# Patient Record
Sex: FEMALE | Race: WHITE
[De-identification: names, ages, dates, MRNs, and addresses within clinical notes are randomized per-mention and may not be internally consistent; named-entity substitution may affect disease eponyms.]

---

## 2020-05-13 ENCOUNTER — HOSPITAL ENCOUNTER (INPATIENT)
Dept: HOSPITAL 62 - ER | Age: 45
LOS: 4 days | Discharge: HOME | DRG: 439 | End: 2020-05-17
Attending: INTERNAL MEDICINE | Admitting: FAMILY MEDICINE
Payer: COMMERCIAL

## 2020-05-13 DIAGNOSIS — E66.01: ICD-10-CM

## 2020-05-13 DIAGNOSIS — K85.20: Primary | ICD-10-CM

## 2020-05-13 DIAGNOSIS — E16.2: ICD-10-CM

## 2020-05-13 DIAGNOSIS — K70.10: ICD-10-CM

## 2020-05-13 DIAGNOSIS — Z98.84: ICD-10-CM

## 2020-05-13 DIAGNOSIS — R94.5: ICD-10-CM
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
## 2020-05-13 DIAGNOSIS — F10.20: ICD-10-CM

## 2020-05-13 DIAGNOSIS — E87.1: ICD-10-CM

## 2020-05-13 LAB
ADD MANUAL DIFF: NO
ALBUMIN SERPL-MCNC: 4.9 G/DL (ref 3.5–5)
ALP SERPL-CCNC: 233 U/L (ref 38–126)
ANION GAP SERPL CALC-SCNC: 21 MMOL/L (ref 5–19)
APPEARANCE UR: (no result)
APTT PPP: YELLOW S
AST SERPL-CCNC: 1255 U/L (ref 14–36)
BASOPHILS # BLD AUTO: 0.1 10^3/UL (ref 0–0.2)
BASOPHILS NFR BLD AUTO: 2.5 % (ref 0–2)
BILIRUB DIRECT SERPL-MCNC: 3 MG/DL (ref 0–0.4)
BILIRUB SERPL-MCNC: 4.7 MG/DL (ref 0.2–1.3)
BILIRUB UR QL STRIP: (no result)
BUN SERPL-MCNC: 4 MG/DL (ref 7–20)
CALCIUM: 9.1 MG/DL (ref 8.4–10.2)
CHLORIDE SERPL-SCNC: 94 MMOL/L (ref 98–107)
CO2 SERPL-SCNC: 19 MMOL/L (ref 22–30)
EOSINOPHIL # BLD AUTO: 0 10^3/UL (ref 0–0.6)
EOSINOPHIL NFR BLD AUTO: 0.5 % (ref 0–6)
ERYTHROCYTE [DISTWIDTH] IN BLOOD BY AUTOMATED COUNT: 18.4 % (ref 11.5–14)
GLUCOSE SERPL-MCNC: 74 MG/DL (ref 75–110)
GLUCOSE UR STRIP-MCNC: NEGATIVE MG/DL
HCT VFR BLD CALC: 41.4 % (ref 36–47)
HGB BLD-MCNC: 14.1 G/DL (ref 12–15.5)
KETONES UR STRIP-MCNC: 80 MG/DL
LYMPHOCYTES # BLD AUTO: 0.9 10^3/UL (ref 0.5–4.7)
LYMPHOCYTES NFR BLD AUTO: 27.6 % (ref 13–45)
MCH RBC QN AUTO: 33.4 PG (ref 27–33.4)
MCHC RBC AUTO-ENTMCNC: 34.1 G/DL (ref 32–36)
MCV RBC AUTO: 98 FL (ref 80–97)
MONOCYTES # BLD AUTO: 0.3 10^3/UL (ref 0.1–1.4)
MONOCYTES NFR BLD AUTO: 10.1 % (ref 3–13)
NEUTROPHILS # BLD AUTO: 1.8 10^3/UL (ref 1.7–8.2)
NEUTS SEG NFR BLD AUTO: 59.3 % (ref 42–78)
PH UR STRIP: 6 [PH] (ref 5–9)
PLATELET # BLD: 153 10^3/UL (ref 150–450)
POTASSIUM SERPL-SCNC: 4.7 MMOL/L (ref 3.6–5)
PROT SERPL-MCNC: 8 G/DL (ref 6.3–8.2)
PROT UR STRIP-MCNC: 30 MG/DL
RBC # BLD AUTO: 4.23 10^6/UL (ref 3.72–5.28)
SP GR UR STRIP: 1.01
TOTAL CELLS COUNTED % (AUTO): 100 %
UROBILINOGEN UR-MCNC: 4 MG/DL (ref ?–2)
WBC # BLD AUTO: 3.1 10^3/UL (ref 4–10.5)

## 2020-05-13 PROCEDURE — 83690 ASSAY OF LIPASE: CPT

## 2020-05-13 PROCEDURE — 85730 THROMBOPLASTIN TIME PARTIAL: CPT

## 2020-05-13 PROCEDURE — 80076 HEPATIC FUNCTION PANEL: CPT

## 2020-05-13 PROCEDURE — 76705 ECHO EXAM OF ABDOMEN: CPT

## 2020-05-13 PROCEDURE — 85610 PROTHROMBIN TIME: CPT

## 2020-05-13 PROCEDURE — S0119 ONDANSETRON 4 MG: HCPCS

## 2020-05-13 PROCEDURE — 93976 VASCULAR STUDY: CPT

## 2020-05-13 PROCEDURE — 36415 COLL VENOUS BLD VENIPUNCTURE: CPT

## 2020-05-13 PROCEDURE — 83735 ASSAY OF MAGNESIUM: CPT

## 2020-05-13 PROCEDURE — 84481 FREE ASSAY (FT-3): CPT

## 2020-05-13 PROCEDURE — 96375 TX/PRO/DX INJ NEW DRUG ADDON: CPT

## 2020-05-13 PROCEDURE — 86900 BLOOD TYPING SEROLOGIC ABO: CPT

## 2020-05-13 PROCEDURE — 84443 ASSAY THYROID STIM HORMONE: CPT

## 2020-05-13 PROCEDURE — 80053 COMPREHEN METABOLIC PANEL: CPT

## 2020-05-13 PROCEDURE — 82150 ASSAY OF AMYLASE: CPT

## 2020-05-13 PROCEDURE — 74177 CT ABD & PELVIS W/CONTRAST: CPT

## 2020-05-13 PROCEDURE — 84703 CHORIONIC GONADOTROPIN ASSAY: CPT

## 2020-05-13 PROCEDURE — 82962 GLUCOSE BLOOD TEST: CPT

## 2020-05-13 PROCEDURE — 96361 HYDRATE IV INFUSION ADD-ON: CPT

## 2020-05-13 PROCEDURE — 99285 EMERGENCY DEPT VISIT HI MDM: CPT

## 2020-05-13 PROCEDURE — 71046 X-RAY EXAM CHEST 2 VIEWS: CPT

## 2020-05-13 PROCEDURE — 81001 URINALYSIS AUTO W/SCOPE: CPT

## 2020-05-13 PROCEDURE — 86901 BLOOD TYPING SEROLOGIC RH(D): CPT

## 2020-05-13 PROCEDURE — 83036 HEMOGLOBIN GLYCOSYLATED A1C: CPT

## 2020-05-13 PROCEDURE — 80061 LIPID PANEL: CPT

## 2020-05-13 PROCEDURE — 80048 BASIC METABOLIC PNL TOTAL CA: CPT

## 2020-05-13 PROCEDURE — 86850 RBC ANTIBODY SCREEN: CPT

## 2020-05-13 PROCEDURE — 85025 COMPLETE CBC W/AUTO DIFF WBC: CPT

## 2020-05-13 PROCEDURE — S0028 INJECTION, FAMOTIDINE, 20 MG: HCPCS

## 2020-05-13 PROCEDURE — 84484 ASSAY OF TROPONIN QUANT: CPT

## 2020-05-13 PROCEDURE — 96374 THER/PROPH/DIAG INJ IV PUSH: CPT

## 2020-05-13 PROCEDURE — 82270 OCCULT BLOOD FECES: CPT

## 2020-05-13 PROCEDURE — 85027 COMPLETE CBC AUTOMATED: CPT

## 2020-05-13 RX ADMIN — SODIUM CHLORIDE, SODIUM LACTATE, POTASSIUM CHLORIDE, AND CALCIUM CHLORIDE PRN MLS/HR: .6; .31; .03; .02 INJECTION, SOLUTION INTRAVENOUS at 20:48

## 2020-05-13 RX ADMIN — SODIUM CHLORIDE, SODIUM LACTATE, POTASSIUM CHLORIDE, AND CALCIUM CHLORIDE PRN MLS/HR: .6; .31; .03; .02 INJECTION, SOLUTION INTRAVENOUS at 23:36

## 2020-05-13 RX ADMIN — HEPARIN SODIUM SCH UNIT: 5000 INJECTION, SOLUTION INTRAVENOUS; SUBCUTANEOUS at 23:06

## 2020-05-13 RX ADMIN — MORPHINE SULFATE PRN MG: 10 INJECTION INTRAMUSCULAR; INTRAVENOUS; SUBCUTANEOUS at 23:06

## 2020-05-13 RX ADMIN — Medication SCH ML: at 23:15

## 2020-05-13 RX ADMIN — FAMOTIDINE SCH MG: 10 INJECTION INTRAVENOUS at 23:05

## 2020-05-13 NOTE — RADIOLOGY REPORT (SQ)
EXAM DESCRIPTION:  CHEST 2 VIEWS



IMAGES COMPLETED DATE/TIME:  5/13/2020 5:26 pm



REASON FOR STUDY:  short of breath



COMPARISON:  None.



EXAM PARAMETERS:  NUMBER OF VIEWS: two views

TECHNIQUE: Digital Frontal and Lateral radiographic views of the chest acquired.

RADIATION DOSE: NA

LIMITATIONS: none



FINDINGS:  LUNGS AND PLEURA: Nonspecific elevation right diaphragm.  No opacities, masses or pneumoth
orax. No pleural effusion.

MEDIASTINUM AND HILAR STRUCTURES: No masses or contour abnormalities.

HEART AND VASCULAR STRUCTURES: Heart normal size.  No evidence for failure.

BONES: No acute findings.

HARDWARE: None in the chest.

OTHER: No other significant finding.



IMPRESSION:  NO ACUTE RADIOGRAPHIC FINDING IN THE CHEST.



TECHNICAL DOCUMENTATION:  JOB ID:  [id number]

 2011 [employer]- All Rights Reserved



Reading location - IP/workstation name: [email or web address]

## 2020-05-13 NOTE — RADIOLOGY REPORT (SQ)
EXAM DESCRIPTION:  U/S ABDOMEN LTD W/DOPPLER



IMAGES COMPLETED DATE/TIME:  5/13/2020 7:12 pm



REASON FOR STUDY:  elevated lipase and lft



COMPARISON:  None.



TECHNIQUE:  Dynamic and static grayscale images acquired of the abdomen and recorded on PACS. Additio
nal selected color Doppler and spectral images recorded.



LIMITATIONS:  None.



FINDINGS:  PANCREAS: Slightly prominent.  No obvious inflammatory changes.

LIVER: Hepatomegaly.  Increased echogenicity.  Heterogeneous echotexture.

LIVER VASCULATURE: Normal directional flow of the main portal vein and hepatic veins.

GALLBLADDER: Surgically absent.

ULTRASOUND-DETECTED NEFF'S SIGN: Not applicable.

INTRAHEPATIC DUCTS AND COMMON DUCT: CBD and intrahepatic ducts normal caliber. No filling defects.

INFERIOR VENA CAVA: Normal flow.

AORTA: No aneurysm.

RIGHT KIDNEY:  Normal size, 11.1 cm. Normal echogenicity. No solid or suspicious masses. No hydroneph
rosis. No calcifications.

PERITONEAL AND RIGHT PLEURAL SPACE: No ascites or effusions.

OTHER: No other significant findings.



IMPRESSION:  Hepatomegaly.  Hepatic steatosis.  Pancreas is slightly prominent but there are no obvio
us inflammatory changes.



TECHNICAL DOCUMENTATION:  JOB ID:  [id number]

 [employer]- All Rights Reserved



Reading location - IP/workstation name: [email or web address]

## 2020-05-13 NOTE — ER DOCUMENT REPORT
ED General





- General


Chief Complaint: Abdominal Pain


Stated Complaint: SHORTNESS OF BREATH


Time Seen by Provider: 05/13/20 16:48


Mode of Arrival: Wheelchair


Information source: Patient


Notes: 





44-year-old female presents to ED for complaint of shortness of breath with hard

black stools history of anemia.  She states she has a horrible taste in her 

mouth and frequent dry heaves with a sore throat.


TRAVEL OUTSIDE OF THE [place] IN LAST 30 DAYS: No





- HPI


Onset: Last week


Quality of pain: Cramping - Couple weeks


Associated symptoms: Nausea, Other - The patient black tarry stools short of 

breath dizziness decreased appetite and nausea


Exacerbated by: Denies


Relieved by: Denies


Similar symptoms previously: Yes


Recently seen / treated by doctor: No





- Related Data


Allergies/Adverse Reactions: 


                                        





No Known Allergies Allergy (Unverified 05/07/16 14:54)


   











Past Medical History





- General


Information source: Patient





- Social History


Smoking Status: Never Smoker


Frequency of alcohol use: Heavy - Couple cocktails a night


Drug Abuse: None


Lives with: Family


Family History: Reviewed & Not Pertinent


Patient has homicidal ideation: No





- Past Medical History


Cardiac Medical History: Reports: None


Pulmonary Medical History: Reports: None


EENT Medical History: Reports: None


Neurological Medical History: Reports: None


Endocrine Medical History: Reports: None


Renal/ Medical History: Reports: None


Malignancy Medical History: Reports: None


GI Medical History: Reports: Other - Gallbladder was attached to the liver 

causing mild to be leakage with gallb


Musculoskeletal Medical History: Reports Hx Musculoskeletal Trauma


Skin Medical History: Reports None


Psychiatric Medical History: Reports: None


Traumatic Medical History: Reports: None


Infectious Medical History: Reports: None


Past Surgical History: Reports: Hx Abdominal Surgery - Gastric sleeve, Hx 

Cholecystectomy - With complications





Review of Systems





- Review of Systems


Constitutional: No symptoms reported


EENT: No symptoms reported


Cardiovascular: No symptoms reported


Respiratory: No symptoms reported


Gastrointestinal: Abdominal pain, Nausea, Constipation.  denies: Vomiting


Genitourinary: No symptoms reported


Female Genitourinary: No symptoms reported


Musculoskeletal: No symptoms reported


Skin: No symptoms reported


Hematologic/Lymphatic: No symptoms reported


Neurological/Psychological: No symptoms reported


-: Yes All other systems reviewed and negative





Physical Exam





- Vital signs


Vitals: 


                                        











Temp Pulse Resp BP Pulse Ox


 


 98.5 F   109 H  21 H  154/111 H  98 


 


 05/13/20 16:44  05/13/20 16:44  05/13/20 16:44  05/13/20 16:44  05/13/20 16:44











Interpretation: Normal





- General


General appearance: Appears well, Alert





- HEENT


Head: Normocephalic, Atraumatic


Eyes: Normal


Pupils: PERRL





- Respiratory


Respiratory status: No respiratory distress


Chest status: Nontender


Breath sounds: Normal


Chest palpation: Normal





- Cardiovascular


Rhythm: Regular


Heart sounds: Normal auscultation


Murmur: No





- Abdominal


Inspection: Normal


Distension: No distension


Bowel sounds: Normal


Tenderness: Tender


Organomegaly: No organomegaly





- Back


Back: Normal, Nontender





- Extremities


General upper extremity: Normal inspection, Nontender, Normal color, Normal ROM,

Normal temperature


General lower extremity: Normal inspection, Nontender, Normal color, Normal ROM,

Normal temperature, Normal weight bearing.  No: Cosme's sign





- Neurological


Neuro grossly intact: Yes


Cognition: Normal


Orientation: AAOx4


[place] Coma Scale Eye Opening: Spontaneous


[place] Coma Scale Verbal: Oriented


[place] Coma Scale Motor: Obeys Commands


[place] Coma Scale Total: 15


Speech: Normal


Motor strength normal: LUE, RUE, LLE, RLE


Sensory: Normal





- Psychological


Associated symptoms: Normal affect, Normal mood





- Skin


Skin Temperature: Warm


Skin Moisture: Dry


Skin Color: Normal





Course





- Re-evaluation


Re-evalutation: 





05/13/20 20:04


Consulted Dr. Roper after getting the lab results.  He recommended the 

ultrasound right upper quadrant.  This did show hepatomegaly with slight 

prominence to the pancreatic pancreas but no abnormal ducts and no ductal 

disease.  I did consult Dr. Weiland who will be admitting the patient for 

pancreatitis and elevated LFTs.  Dr. Weiland is aware that she drinks several 

cocktails a night.





- Vital Signs


Vital signs: 


                                        











Temp Pulse Resp BP Pulse Ox


 


 98.2 F   76   18   155/76 H  99 


 


 05/13/20 21:29  05/13/20 21:29  05/13/20 21:29  05/13/20 21:29  05/13/20 21:29














- Laboratory


Result Diagrams: 


                                 05/13/20 17:28





                                 05/13/20 17:28


Laboratory results interpreted by me: 


                                        











  05/13/20 05/13/20 05/13/20





  17:28 17:28 17:28


 


WBC  3.1 L  


 


MCV  98 H  


 


RDW  18.4 H  


 


Baso % (Auto)  2.5 H  


 


Sodium   133.6 L 


 


Chloride   94 L 


 


Carbon Dioxide   19 L 


 


Anion Gap   21 H 


 


BUN   4 L 


 


Glucose   74 L 


 


Total Bilirubin   4.7 H 


 


Direct Bilirubin   3.0 H 


 


AST   1255 H 


 


ALT   487 H 


 


Alkaline Phosphatase   233 H 


 


Lipase   2241.7 H 


 


Urine Protein    30 H


 


Urine Ketones    80 H


 


Urine Blood    SMALL H


 


Urine Bilirubin    SMALL H


 


Urine Urobilinogen    4.0 H














- Diagnostic Test


Radiology reviewed: Image reviewed, Reports reviewed





Discharge





- Discharge


Clinical Impression: 


 Elevated LFTs





Pancreatitis


Qualifiers:


 Chronicity: acute Pancreatitis type: unspecified pancreatitis type Acute panc

reatitis complication: unspecified Qualified Code(s): K85.90 - Acute 

pancreatitis without necrosis or infection, unspecified





Disposition: ADMITTED AS INPATIENT


Admitting Provider: Weiland (Hospitalist)


Unit Admitted: Medical Floor

## 2020-05-13 NOTE — RADIOLOGY REPORT (SQ)
CT ABDOMEN PELVIS WITH IV CONTRAST



EXAM DATE: 5/13/2020 12:00 AM CDT



HISTORY: Abdominal pain. Elevated lipase and liver function

enzymes.



COMPARISON: None.



TECHNIQUE: 



CT scan of the abdomen and pelvis was performed with IV contrast.





This exam was performed according to our departmental

dose-optimization program, which includes automated exposure

control, adjustment of the mA and/or kV according to patient size

and/or use of iterative reconstruction technique.



FINDINGS:



The lung bases are clear. No pleural or pericardial effusions.

There is no hiatal hernia with prior gastric surgery noted.



There is diffuse hepatic steatosis. There has been a prior

cholecystectomy. Query mild inflammatory stranding of the

pancreatic head. The spleen, adrenal glands, kidneys, and pelvic

organs are normal. No obstructing urinary stones.



The colon is underdistended with submucosal fatty infiltration.

The appendix is unremarkable. There is no small bowel obstruction

or inflammation. No intraperitoneal free fluid or free air is

seen.



There are mild degenerative changes of the spine. The aorta is

unremarkable. No abnormal body wall hernia is seen.



IMPRESSION:



1.  Diffuse hepatic steatosis; please correlate with liver

function tests to exclude steatohepatitis.

2.  Query mild inflammatory stranding of the pancreatic head

which may represent pancreatitis.

## 2020-05-13 NOTE — PDOC H&P
History of Present Illness


Admission Date/PCP: 


05/13/2020  19:52


No local PCP


Patient complains of: Abdominal pain


History of Present Illness: 


JUSTA GRIFFIN is a 44 year old female who presents to the emergency room with a 

2-week history of abdominal pain.  She admits the gradual onset and gradual 

worsening of intermittent sharp crampy upper abdominal pain over the last 2 

weeks, with the pain becoming severe today.  Her abdominal pain has been 

accompanied by dyspnea and associated with hard dark stools, anorexia, 

dizziness, nausea, dry heaves, pharyngitis, dyspepsia and dysgeusia.  She denies

other associated or accompanying signs and symptoms.  Her pain and other 

symptoms are worsened by attempts at oral intake.  She admits prior similar 

episodes related to recurrent anemia.  She has not identified any additional 

aggravating or ameliorating factors for her abdominal pain.  In the emergency 

room she was found to have an elevated lipase as well as elevated liver enzymes 

with no evidence of biliary obstruction on an upper abdominal ultrasound.  She 

was subsequently admitted to the hospital for further evaluation treatment.





Past Medical History


Cardiac Medical History: 


   Denies: Atrial Fibrillation, Coronary Artery Disease, DVT, Hyperlipidema, 

Hypertension


Pulmonary Medical History: 


   Denies: Asthma, Chronic Obstructive Pulmonary Disease (COPD)


EENT Medical History: 


   Denies: Cataracts, Ears - Hearing aids


Neurological Medical History: 


   Denies: Hemorrhagic CVA, Ischemic CVA, Seizures


Endocrine Medical History: Reports: Obesity


   Denies: Diabetes Mellitus Type 1, Diabetes Mellitus Type 2, Hyperthyroidism, 

Hypothyroidism


Renal/ Medical History: 


   Denies: Chronic Kidney Disease, Nephrolithiasis


Malignancy Medical History: Reports: None


GI Medical History: Reports: Other - Cholecystectomy with complication of intra-

abdominal bile leakage


   Denies: Cirrhosis, Crohn's Disease, Hepatitis, Peptic Ulcer Disease, 

Ulcerative Colitis


Musculoskeltal Medical History: 


   Denies: Arthritis, Fibromyalgia, Gout


Skin Medical History: 


   Denies: Eczema, Psoriasis


Psychiatric Medical History: 


   Denies: Alcohol Dependency, Substance Abuse, Tobacco Dependency


Traumatic Medical History: Reports: None


Hematology: Reports: Anemia


   Denies: Bleeding Tendencies


Infectious Medical History: Reports: None





Past Surgical History


Past Surgical History: Reports: Cholecystectomy - Complicated by postoperative 

intra-abdominal bile leak, Gastric Bypass Surgery





Social History


Information Source: Patient


Lives with: Family


Smoking Status: Never Smoker


Electronic Cigarette use?: No


Frequency of Alcohol Use: Heavy - 2-3 drinks of liquor every night


Hx Recreational Drug Use: No


Drugs: None


Hx Prescription Drug Abuse: No





- Advance Directive


Resuscitation Status: Full Code


Surrogate healthcare decision maker:: 


Urban Griffin





Family History


Family History: denies: CAD, DM, Hypertension, Malignancy


Parental Family History Reviewed: Yes


Children Family History Reviewed: No


Sibling(s) Family History Reviewed.: Yes





Medication/Allergy


Home Medications: 








No Home Medications  05/13/20 








Allergies/Adverse Reactions: 


                                        





No Known Allergies Allergy (Unverified 05/07/16 14:54)


   











Review of Systems


Constitutional: PRESENT: as per HPI, anorexia.  ABSENT: chills, fever(s)


Eyes: ABSENT: visual disturbances, other - Eye pain


Ears: ABSENT: hearing changes, other - Ear pain


Nose, Mouth, and Throat: PRESENT: as per HPI, sore throat.  ABSENT: headache(s)


Cardiovascular: ABSENT: chest pain, palpitations


Respiratory: PRESENT: dyspnea.  ABSENT: cough


Gastrointestinal: PRESENT: as per HPI, abdominal pain, constipation, melena - 

Dark stools, nausea, vomiting - Dry heaves.  ABSENT: diarrhea, hematemesis, 

hematochezia


Genitourinary: ABSENT: dysuria, hematuria


Musculoskeletal: ABSENT: back pain, joint swelling


Integumentary: ABSENT: pruritus, rash


Neurological: PRESENT: as per HPI, dizziness - Lightheadedness.  ABSENT: 

confusion, convulsions, focal weakness, memory loss, syncope


Psychiatric: ABSENT: anxiety, depression


Endocrine: ABSENT: cold intolerance, heat intolerance, polydipsia, polyphagia, 

polyuria


Hematologic/Lymphatic: ABSENT: easy bleeding, easy bruising


Allergic/Immunologic: ABSENT: seasonal rhinorrhea





Physical Exam


Vital Signs: 


                                        











Temp Pulse Resp BP Pulse Ox


 


 98.5 F   89   21 H  162/88 H  98 


 


 05/13/20 16:54  05/13/20 17:01  05/13/20 16:44  05/13/20 17:05  05/13/20 16:44








                                 Intake & Output











 05/11/20 05/12/20 05/13/20





 23:59 23:59 23:59


 


Intake Total   1000


 


Balance   1000


 


Weight   112.1 kg











General appearance: PRESENT: cooperative, mild distress - Due to abdominal pain,

obese


Head exam: PRESENT: atraumatic, normocephalic


Eye exam: PRESENT: conjunctiva pink.  ABSENT: conjunctival injection, scleral 

icterus


Ear exam: PRESENT: normal external ear exam.  ABSENT: bleeding, drainage


Mouth exam: PRESENT: dry mucosa, neck supple


Neck exam: ABSENT: thyromegaly, tracheal deviation


Respiratory exam: PRESENT: clear to auscultation [place], symmetrical, unlabored


Cardiovascular exam: PRESENT: RRR.  ABSENT: clicks, gallop, rubs


Pulses: PRESENT: normal radial pulses, normal dorsalis pedis pul


Vascular exam: PRESENT: normal capillary refill.  ABSENT: pallor


GI/Abdominal exam: PRESENT: normal bowel sounds, soft, tenderness - Generalized 

upper abdominal tenderness


Rectal exam: PRESENT: deferred


Extremities exam: ABSENT: joint swelling, pedal edema


Musculoskeletal exam: ABSENT: deformity, dislocation


Neurological exam: PRESENT: alert, oriented to person, oriented to place, 

oriented to time, oriented to situation, CN II-XII grossly intact.  ABSENT: 

motor sensory deficit


Psychiatric exam: PRESENT: appropriate affect, normal mood


Skin exam: PRESENT: dry, intact, warm.  ABSENT: jaundice, rash, urticaria





Results


Laboratory Results: 


                                        





                                 05/13/20 17:28 





                                 05/13/20 17:28 





                                        











  05/13/20 05/13/20 05/13/20





  17:28 17:28 17:28


 


WBC  3.1 L  


 


RBC  4.23  


 


Hgb  14.1  


 


Hct  41.4  


 


MCV  98 H  


 


MCH  33.4  


 


MCHC  34.1  


 


RDW  18.4 H  


 


Plt Count  153  


 


Seg Neutrophils %  59.3  


 


Sodium   133.6 L 


 


Potassium   4.7 


 


Chloride   94 L 


 


Carbon Dioxide   19 L 


 


Anion Gap   21 H 


 


BUN   4 L 


 


Creatinine   0.58 


 


Est GFR ( Amer)   > 60 


 


Glucose   74 L 


 


Calcium   9.1 


 


Total Bilirubin   4.7 H 


 


AST   1255 H 


 


Alkaline Phosphatase   233 H 


 


Total Protein   8.0 


 


Albumin   4.9 


 


Lipase   2241.7 H 


 


Serum HCG, Qual    NEGATIVE


 


Urine Color   


 


Urine Appearance   


 


Urine pH   


 


Ur Specific Gravity   


 


Urine Protein   


 


Urine Glucose (UA)   


 


Urine Ketones   


 


Urine Blood   


 


Urine RBC (Auto)   


 


Blood Type   


 


Antibody Screen   














  05/13/20 05/13/20





  17:28 17:28


 


WBC  


 


RBC  


 


Hgb  


 


Hct  


 


MCV  


 


MCH  


 


MCHC  


 


RDW  


 


Plt Count  


 


Seg Neutrophils %  


 


Sodium  


 


Potassium  


 


Chloride  


 


Carbon Dioxide  


 


Anion Gap  


 


BUN  


 


Creatinine  


 


Est GFR (African Amer)  


 


Glucose  


 


Calcium  


 


Total Bilirubin  


 


AST  


 


Alkaline Phosphatase  


 


Total Protein  


 


Albumin  


 


Lipase  


 


Serum HCG, Qual  


 


Urine Color   YELLOW


 


Urine Appearance   SLIGHTLY-CLOUDY


 


Urine pH   6.0


 


Ur Specific Gravity   1.014


 


Urine Protein   30 H


 


Urine Glucose (UA)   NEGATIVE


 


Urine Ketones   80 H


 


Urine Blood   SMALL H


 


Urine RBC (Auto)   0


 


Blood Type  A POSITIVE 


 


Antibody Screen  NEGATIVE 








                                        











  05/13/20





  17:28


 


Troponin I  0.019











Impressions: 


                                        





Chest X-Ray  05/13/20 17:09


IMPRESSION:  NO ACUTE RADIOGRAPHIC FINDING IN THE CHEST.


 








Abdomen Ultrasound  05/13/20 18:29


IMPRESSION:  Hepatomegaly.  Hepatic steatosis.  Pancreas is slightly prominent 

but there are no obvious inflammatory changes.


 














Assessment and Plan





- Diagnosis


(1) Abdominal pain


Qualifiers: 


   Abdominal location: upper abdomen, unspecified   Qualified Code(s): R10.10 - 

Upper abdominal pain, unspecified   


Is this a current diagnosis for this admission?: Yes   





(2) Acute pancreatitis


Qualifiers: 


   Pancreatitis type: alcohol induced   Acute pancreatitis complication: 

unspecified   Qualified Code(s): K85.20 - Alcohol induced acute pancreatitis 

without necrosis or infection   


Is this a current diagnosis for this admission?: Yes   





(3) Elevated LFTs


Is this a current diagnosis for this admission?: Yes   





(4) Hyponatremia


Is this a current diagnosis for this admission?: Yes   





(5) Obesity


Qualifiers: 


   Obesity type: unspecified obesity type   Obesity classification: adult class 

2 (BMI 35 - 39.9)   Serious obesity comorbidity presence: unspecified whether 

serious comorbidity present   Body mass index: BMI 38.0-38.9   Qualified 

Code(s): E66.9 - Obesity, unspecified; Z68.38 - Body mass index (BMI) 38.0-38.9,

adult   


Is this a current diagnosis for this admission?: Yes   





- Plan Summary


Summary: 


Patient will be admitted to the medical floor where she will receive routine 

supportive and symptomatic cares.  She will be treated with IV fluids using LR 

at 167 mL/h.  She will receive morphine sulfate 2 to 4 mg IV every 2 hours on an

as-needed basis for pain control using a sliding scale for dosing.  She will 

receive Ativan 1 mg IV every 4 hours as needed for anxiety or restlessness.  A 

surgical consultation with Dr. Cerrato will be obtained on a routine basis.  

CBCs, metabolic profiles, magnesium levels, lipase levels, liver function 

studies, lipid profiles and thyroid studies will be obtained as appropriate.  Miguel eckert will initially be on a clear liquid diet with diet advancement based upon 

her tolerance.





- Time


Time Spent with patient: 15-24 minutes


Medications reviewed and adjusted accordingly: Yes


Anticipated discharge: Home





- Inpatient Certification


Based on my medical assessment, after consideration of the patient's 

comorbidities, presenting symptoms, or acuity I expect that the services needed 

warrant INPATIENT care.: Yes


I certify that my determination is in accordance with my understanding of 

Medicare's requirements for reasonable and necessary INPATIENT services [42 CFR 

412.3e].: Yes


Medical Necessity: Need Close Monitoring Due to Risk of Patient Decompensation, 

Need For IV Fluids, Need for Pain Control, Risk of Complication if Not Cared For

in Hospital

## 2020-05-14 LAB
ALBUMIN SERPL-MCNC: 3.8 G/DL (ref 3.5–5)
ALP SERPL-CCNC: 175 U/L (ref 38–126)
AMYLASE SERPL-CCNC: 117 U/L (ref 30–110)
ANION GAP SERPL CALC-SCNC: 20 MMOL/L (ref 5–19)
APTT BLD: 23 SEC (ref 23.5–35.8)
AST SERPL-CCNC: 941 U/L (ref 14–36)
BILIRUB DIRECT SERPL-MCNC: 3.2 MG/DL (ref 0–0.4)
BILIRUB SERPL-MCNC: 4.8 MG/DL (ref 0.2–1.3)
BUN SERPL-MCNC: 2 MG/DL (ref 7–20)
CALCIUM: 8.2 MG/DL (ref 8.4–10.2)
CHLORIDE SERPL-SCNC: 100 MMOL/L (ref 98–107)
CHOLEST SERPL-MCNC: 232.69 MG/DL (ref 0–200)
CO2 SERPL-SCNC: 13 MMOL/L (ref 22–30)
ERYTHROCYTE [DISTWIDTH] IN BLOOD BY AUTOMATED COUNT: 18.1 % (ref 11.5–14)
GLUCOSE SERPL-MCNC: 33 MG/DL (ref 75–110)
HCT VFR BLD CALC: 33.6 % (ref 36–47)
HGB BLD-MCNC: 11.6 G/DL (ref 12–15.5)
INR PPP: 1.03
LDLC SERPL DIRECT ASSAY-MCNC: 84 MG/DL (ref ?–100)
MCH RBC QN AUTO: 33.8 PG (ref 27–33.4)
MCHC RBC AUTO-ENTMCNC: 34.5 G/DL (ref 32–36)
MCV RBC AUTO: 98 FL (ref 80–97)
PLATELET # BLD: 104 10^3/UL (ref 150–450)
POTASSIUM SERPL-SCNC: 4.3 MMOL/L (ref 3.6–5)
PROT SERPL-MCNC: 6.3 G/DL (ref 6.3–8.2)
PROTHROMBIN TIME: 13.5 SEC (ref 11.4–15.4)
RBC # BLD AUTO: 3.42 10^6/UL (ref 3.72–5.28)
T3FREE SERPL-MCNC: 3.02 PG/ML (ref 2.77–5.27)
TRIGL SERPL-MCNC: 81 MG/DL (ref ?–150)
TSH SERPL-ACNC: 4.54 UIU/ML (ref 0.47–4.68)
VLDLC SERPL CALC-MCNC: 16.2 MG/DL (ref 10–31)
WBC # BLD AUTO: 2.7 10^3/UL (ref 4–10.5)

## 2020-05-14 RX ADMIN — METOCLOPRAMIDE SCH MG: 5 INJECTION, SOLUTION INTRAMUSCULAR; INTRAVENOUS at 08:16

## 2020-05-14 RX ADMIN — Medication SCH: at 05:33

## 2020-05-14 RX ADMIN — METOCLOPRAMIDE SCH MG: 5 INJECTION, SOLUTION INTRAMUSCULAR; INTRAVENOUS at 16:32

## 2020-05-14 RX ADMIN — Medication SCH: at 14:11

## 2020-05-14 RX ADMIN — PROMETHAZINE HYDROCHLORIDE PRN MG: 25 INJECTION INTRAMUSCULAR; INTRAVENOUS at 16:44

## 2020-05-14 RX ADMIN — MORPHINE SULFATE PRN MG: 10 INJECTION INTRAMUSCULAR; INTRAVENOUS; SUBCUTANEOUS at 11:18

## 2020-05-14 RX ADMIN — MORPHINE SULFATE PRN MG: 10 INJECTION INTRAMUSCULAR; INTRAVENOUS; SUBCUTANEOUS at 20:54

## 2020-05-14 RX ADMIN — Medication SCH ML: at 23:13

## 2020-05-14 RX ADMIN — MORPHINE SULFATE PRN MG: 10 INJECTION INTRAMUSCULAR; INTRAVENOUS; SUBCUTANEOUS at 05:24

## 2020-05-14 RX ADMIN — HEPARIN SODIUM SCH UNIT: 5000 INJECTION, SOLUTION INTRAVENOUS; SUBCUTANEOUS at 05:31

## 2020-05-14 RX ADMIN — HEPARIN SODIUM SCH: 5000 INJECTION, SOLUTION INTRAVENOUS; SUBCUTANEOUS at 14:11

## 2020-05-14 RX ADMIN — MORPHINE SULFATE PRN MG: 10 INJECTION INTRAMUSCULAR; INTRAVENOUS; SUBCUTANEOUS at 16:33

## 2020-05-14 RX ADMIN — HEPARIN SODIUM SCH: 5000 INJECTION, SOLUTION INTRAVENOUS; SUBCUTANEOUS at 23:10

## 2020-05-14 RX ADMIN — FAMOTIDINE SCH MG: 10 INJECTION INTRAVENOUS at 23:10

## 2020-05-14 RX ADMIN — SODIUM CHLORIDE AND POTASSIUM CHLORIDE PRN MLS/HR: 9; 2.98 INJECTION, SOLUTION INTRAVENOUS at 16:58

## 2020-05-14 RX ADMIN — METOCLOPRAMIDE SCH MG: 5 INJECTION, SOLUTION INTRAMUSCULAR; INTRAVENOUS at 11:18

## 2020-05-14 RX ADMIN — SODIUM CHLORIDE AND POTASSIUM CHLORIDE PRN MLS/HR: 9; 2.98 INJECTION, SOLUTION INTRAVENOUS at 11:19

## 2020-05-14 RX ADMIN — PROMETHAZINE HYDROCHLORIDE PRN MG: 25 INJECTION INTRAMUSCULAR; INTRAVENOUS at 11:19

## 2020-05-14 RX ADMIN — METOCLOPRAMIDE SCH MG: 5 INJECTION, SOLUTION INTRAMUSCULAR; INTRAVENOUS at 23:10

## 2020-05-14 RX ADMIN — PROMETHAZINE HYDROCHLORIDE PRN MG: 25 INJECTION INTRAMUSCULAR; INTRAVENOUS at 07:00

## 2020-05-14 RX ADMIN — METOCLOPRAMIDE SCH MG: 5 INJECTION, SOLUTION INTRAMUSCULAR; INTRAVENOUS at 00:43

## 2020-05-14 RX ADMIN — SODIUM CHLORIDE, SODIUM LACTATE, POTASSIUM CHLORIDE, AND CALCIUM CHLORIDE PRN MLS/HR: .6; .31; .03; .02 INJECTION, SOLUTION INTRAVENOUS at 05:29

## 2020-05-14 RX ADMIN — MORPHINE SULFATE PRN MG: 10 INJECTION INTRAMUSCULAR; INTRAVENOUS; SUBCUTANEOUS at 01:24

## 2020-05-14 RX ADMIN — MORPHINE SULFATE PRN MG: 10 INJECTION INTRAMUSCULAR; INTRAVENOUS; SUBCUTANEOUS at 08:16

## 2020-05-14 RX ADMIN — FAMOTIDINE SCH MG: 10 INJECTION INTRAVENOUS at 11:18

## 2020-05-14 NOTE — PDOC PROGRESS REPORT
Subjective


Progress Note for:: 05/14/20


Subjective:: 





Patient complains of: Abdominal pain


History of Present Illness: 


JUSTA GRIFFIN is a 44 year old female who presents to the emergency room with a 

2-week history of abdominal pain.  She admits the gradual onset and gradual 

worsening of intermittent sharp crampy upper abdominal pain over the last 2 

weeks, with the pain becoming severe today.  Her abdominal pain has been 

accompanied by dyspnea and associated with hard dark stools, anorexia, 

dizziness, nausea, dry heaves, pharyngitis, dyspepsia and dysgeusia.  She denies

other associated or accompanying signs and symptoms.  Her pain and other 

symptoms are worsened by attempts at oral intake.  She admits prior similar 

episodes related to recurrent anemia.  She has not identified any additional 

aggravating or ameliorating factors for her abdominal pain.  In the emergency 

room she was found to have an elevated lipase as well as elevated liver enzymes 

with no evidence of biliary obstruction on an upper abdominal ultrasound.  She 

was subsequently admitted to the hospital for further evaluation treatment.





Interval history:


5/14/2020: patient was seen and examined.  She is doing better.  Pain is 

improved.  She is tolerated clear liquid diet.  Liver enzymes are trending down.

 Glucose level was down to 33 this morning.


Reason For Visit: 


ACUTE PANCREATITIS








Physical Exam


Vital Signs: 


                                        











Temp Pulse Resp BP Pulse Ox


 


 97.7 F   94   20   188/101 H  97 


 


 05/14/20 07:06  05/14/20 07:06  05/14/20 07:06  05/14/20 07:06  05/14/20 07:06








                                 Intake & Output











 05/13/20 05/14/20 05/15/20





 06:59 06:59 06:59


 


Intake Total  3451 


 


Balance  3451 


 


Weight  253 lb 12.033 oz 











General appearance: PRESENT: no acute distress, cooperative, morbidly obese


Head exam: PRESENT: atraumatic, normocephalic


Eye exam: PRESENT: EOMI, PERRLA


Mouth exam: PRESENT: moist, neck supple


Neck exam: ABSENT: meningismus, tenderness


Respiratory exam: PRESENT: clear to auscultation sh[place].  ABSENT: accessory muscle 

use


Cardiovascular exam: PRESENT: RRR, +S1, +S2


GI/Abdominal exam: PRESENT: normal bowel sounds, tenderness


Rectal exam: PRESENT: deferred


Extremities exam: ABSENT: calf tenderness, joint swelling, pedal edema


Neurological exam: PRESENT: alert, awake, oriented to person, oriented to place,

oriented to time, oriented to situation


Psychiatric exam: PRESENT: appropriate affect, normal mood


Skin exam: PRESENT: dry, intact





Results


Laboratory Results: 


                                        





                                 05/14/20 05:04 





                                 05/14/20 05:04 





                                        











  05/13/20 05/13/20 05/13/20





  17:28 17:28 17:28


 


WBC  3.1 L  


 


RBC  4.23  


 


Hgb  14.1  


 


Hct  41.4  


 


MCV  98 H  


 


MCH  33.4  


 


MCHC  34.1  


 


RDW  18.4 H  


 


Plt Count  153  


 


Seg Neutrophils %  59.3  


 


Sodium   133.6 L 


 


Potassium   4.7 


 


Chloride   94 L 


 


Carbon Dioxide   19 L 


 


Anion Gap   21 H 


 


BUN   4 L 


 


Creatinine   0.58 


 


Est GFR ( Amer)   > 60 


 


Glucose   74 L 


 


Calcium   9.1 


 


Magnesium   


 


Total Bilirubin   4.7 H 


 


AST   1255 H 


 


Alkaline Phosphatase   233 H 


 


Total Protein   8.0 


 


Albumin   4.9 


 


Triglycerides   


 


Cholesterol   


 


LDL Cholesterol Direct   


 


VLDL Cholesterol   


 


HDL Cholesterol   


 


Amylase   


 


Lipase   2241.7 H 


 


TSH   


 


Free T3 pg/mL   


 


Serum HCG, Qual    NEGATIVE


 


Urine Color   


 


Urine Appearance   


 


Urine pH   


 


Ur Specific Gravity   


 


Urine Protein   


 


Urine Glucose (UA)   


 


Urine Ketones   


 


Urine Blood   


 


Urine RBC (Auto)   


 


Blood Type   


 


Antibody Screen   














  05/13/20 05/13/20 05/14/20





  17:28 17:28 05:04


 


WBC    2.7 L


 


RBC    3.42 L


 


Hgb    11.6 L D


 


Hct    33.6 L


 


MCV    98 H


 


MCH    33.8 H


 


MCHC    34.5


 


RDW    18.1 H


 


Plt Count    104 L


 


Seg Neutrophils %   


 


Sodium   


 


Potassium   


 


Chloride   


 


Carbon Dioxide   


 


Anion Gap   


 


BUN   


 


Creatinine   


 


Est GFR (African Amer)   


 


Glucose   


 


Calcium   


 


Magnesium   


 


Total Bilirubin   


 


AST   


 


Alkaline Phosphatase   


 


Total Protein   


 


Albumin   


 


Triglycerides   


 


Cholesterol   


 


LDL Cholesterol Direct   


 


VLDL Cholesterol   


 


HDL Cholesterol   


 


Amylase   


 


Lipase   


 


TSH   


 


Free T3 pg/mL   


 


Serum HCG, Qual   


 


Urine Color   YELLOW 


 


Urine Appearance   SLIGHTLY-CLOUDY 


 


Urine pH   6.0 


 


Ur Specific Gravity   1.014 


 


Urine Protein   30 H 


 


Urine Glucose (UA)   NEGATIVE 


 


Urine Ketones   80 H 


 


Urine Blood   SMALL H 


 


Urine RBC (Auto)   0 


 


Blood Type  A POSITIVE  


 


Antibody Screen  NEGATIVE  














  05/14/20 05/14/20





  05:04 05:04


 


WBC  


 


RBC  


 


Hgb  


 


Hct  


 


MCV  


 


MCH  


 


MCHC  


 


RDW  


 


Plt Count  


 


Seg Neutrophils %  


 


Sodium  133.2 L 


 


Potassium  4.3 


 


Chloride  100 


 


Carbon Dioxide  13 L 


 


Anion Gap  20 H 


 


BUN  2 L 


 


Creatinine  0.53 


 


Est GFR (African Amer)  > 60 


 


Glucose  33 L* 


 


Calcium  8.2 L 


 


Magnesium  1.6 


 


Total Bilirubin  4.8 H 


 


AST  941 H 


 


Alkaline Phosphatase  175 H 


 


Total Protein  6.3 


 


Albumin  3.8 


 


Triglycerides  81 


 


Cholesterol  232.69 H 


 


LDL Cholesterol Direct  84 


 


VLDL Cholesterol  16.2 


 


HDL Cholesterol  127 


 


Amylase  117 H 


 


Lipase  2009.8 H 


 


TSH   4.54


 


Free T3 pg/mL   3.02


 


Serum HCG, Qual  


 


Urine Color  


 


Urine Appearance  


 


Urine pH  


 


Ur Specific Gravity  


 


Urine Protein  


 


Urine Glucose (UA)  


 


Urine Ketones  


 


Urine Blood  


 


Urine RBC (Auto)  


 


Blood Type  


 


Antibody Screen  








                                        











  05/13/20





  17:28


 


Troponin I  0.019











Impressions: 


                                        





Abdomen/Pelvis CT  05/13/20 00:00


IMPRESSION:


 


1.  Diffuse hepatic steatosis; please correlate with liver


function tests to exclude steatohepatitis.


2.  Query mild inflammatory stranding of the pancreatic head


which may represent pancreatitis.


 








Chest X-Ray  05/13/20 17:09


IMPRESSION:  NO ACUTE RADIOGRAPHIC FINDING IN THE CHEST.


 








Abdomen Ultrasound  05/13/20 18:29


IMPRESSION:  Hepatomegaly.  Hepatic steatosis.  Pancreas is slightly prominent 

but there are no obvious inflammatory changes.


 














Assessment and Plan





- Diagnosis


(1) Abdominal pain


Qualifiers: 


   Abdominal location: upper abdomen, unspecified   Qualified Code(s): R10.10 - 

Upper abdominal pain, unspecified   


Is this a current diagnosis for this admission?: Yes   


Plan: 


Improved.  Continue current medications.








(2) Acute pancreatitis


Qualifiers: 


   Pancreatitis type: alcohol induced   Acute pancreatitis complication: 

unspecified   Qualified Code(s): K85.20 - Alcohol induced acute pancreatitis 

without necrosis or infection   


Is this a current diagnosis for this admission?: Yes   


Plan: 


Keep her on clear liquid diet.  Continue IV fluids.  Continue symptomatic 

management.








(3) Alcoholic hepatitis


Qualifiers: 


   Ascites presence: without ascites   Qualified Code(s): K70.10 - Alcoholic 

hepatitis without ascites   


Is this a current diagnosis for this admission?: Yes   


Plan: 


Check INR and PT to calculate DF.  Appears to be improving.  Abstinence was rec

ommended.








(4) Hyponatremia


Is this a current diagnosis for this admission?: Yes   


Plan: 


Mild and asymptomatic.  Monitor.








(5) Obesity


Qualifiers: 


   Obesity type: unspecified obesity type   Obesity classification: adult class 

2 (BMI 35 - 39.9)   Serious obesity comorbidity presence: unspecified whether 

serious comorbidity present   Body mass index: BMI 38.0-38.9   Qualified 

Code(s): E66.9 - Obesity, unspecified; Z68.38 - Body mass index (BMI) 38.0-38.9,

adult   


Is this a current diagnosis for this admission?: Yes   


Plan: 


Patient was counseled.








(6) Hypoglycemia


Is this a current diagnosis for this admission?: Yes   


Plan: 


Change fluids to D5 normal saline.  Monitor glucose levels.








- Plan Summary


Summary: 


Patient will be admitted to the medical floor where she will receive routine 

supportive and symptomatic cares.  She will be treated with IV fluids using LR 

at 167 mL/h.  She will receive morphine sulfate 2 to 4 mg IV every 2 hours on an

as-needed basis for pain control using a sliding scale for dosing.  She will 

receive Ativan 1 mg IV every 4 hours as needed for anxiety or restlessness.  A 

surgical consultation with Dr. Cerrato will be obtained on a routine basis.  

CBCs, metabolic profiles, magnesium levels, lipase levels, liver function 

studies, lipid profiles and thyroid studies will be obtained as appropriate.  

Patient will initially be on a clear liquid diet with diet advancement based 

upon her tolerance.

## 2020-05-14 NOTE — PDOC CONSULTATION
Consultation


Consult Date: 05/14/20


Provider Consulted: SURGICAL SURGICALIST MD


Consult reason:: pancreatitis





History of Present Illness


Admission Date/PCP: 


  05/13/20 20:00





  





Patient complains of: abdominal pain, nausea, and vomiting


History of Present Illness: 


JUSTA GRIFFIN is a 44 year old female seen in consultation at the request of the

hospitalist service.  The patient has a history of alcohol abuse.  She drinks 

every day.  She complains of sharp, stabbing pain in the epigastric region, 

radiating to her back.  She has constant nausea and dry heaves.  She reports th

at she has not been able to hold anything down in 2 days.  Her pain started 2 to

3 days ago, and has progressively worsened.  She presented to the hospital where

she was found to have elevation of her LFTs and lipase.  The patient was 

admitted to the hospitalist service for alcoholic pancreatitis as well as 

alcoholic hepatitis.  Patient has a remote history of open cholecystectomy due 

to severe acute cholecystitis.  She denies chest pain, shortness of breath, 

headache, fevers, chills, melena, hematochezia, hematemesis, blurry vision, 

orthostasis.  She does report severe nausea, vomiting, abdominal pain, malaise, 

fatigue.  Nothing makes her symptoms better or worse.  They are constant.








Past Medical History


Cardiac Medical History: Reports: None


   Denies: Atrial Fibrillation, Coronary Artery Disease, DVT, Hyperlipidema, 

Hypertension


Pulmonary Medical History: Reports: None


   Denies: Asthma, Chronic Obstructive Pulmonary Disease (COPD)


EENT Medical History: Reports: None


   Denies: Cataracts, Ears - Hearing aids


Neurological Medical History: Reports: None


   Denies: Hemorrhagic CVA, Ischemic CVA, Seizures


Endocrine Medical History: Reports: None, Obesity


   Denies: Diabetes Mellitus Type 1, Diabetes Mellitus Type 2, Hyperthyroidism, 

Hypothyroidism


Renal/ Medical History: Reports: None


   Denies: Chronic Kidney Disease, Nephrolithiasis


Malignancy Medical History: Reports: None


GI Medical History: Reports: Other - Cholecystectomy with complication of intra-

abdominal bile leakage


   Denies: Cirrhosis, Crohn's Disease, Hepatitis, Peptic Ulcer Disease, 

Ulcerative Colitis


Musculoskeltal Medical History: 


   Denies: Arthritis, Fibromyalgia, Gout


Skin Medical History: Reports: None


   Denies: Eczema, Psoriasis


Psychiatric Medical History: Reports: None


   Denies: Alcohol Dependency, Depression, Substance Abuse, Tobacco Dependency


Traumatic Medical History: Reports: None


Hematology: Reports: Anemia


   Denies: Bleeding Tendencies


Infectious Medical History: Reports: None





Past Surgical History


Past Surgical History: Reports: Cholecystectomy - Complicated by postoperative 

intra-abdominal bile leak, Gastric Bypass Surgery, Other - Gastric sleeve 

surgery





Social History


Lives with: Family


Smoking Status: Never Smoker


Electronic Cigarette use?: No


Frequency of Alcohol Use: Heavy - 2-3 drinks of liquor every night


Hx Recreational Drug Use: No


Drugs: None


Hx Prescription Drug Abuse: No





- Advance Directive


Resuscitation Status: Full Code





Family History


Family History: denies: CAD, DM, Hypertension, Malignancy


Parental Family History Reviewed: Yes


Children Family History Reviewed: Yes


Sibling(s) Family History Reviewed.: Yes





Medication/Allergy


Home Medications: 








No Home Medications  05/13/20 








Allergies/Adverse Reactions: 


                                        





No Known Allergies Allergy (Unverified 05/07/16 14:54)


   











Review of Systems


Constitutional: PRESENT: fatigue.  ABSENT: anorexia, chills, fever(s), 

headache(s)


Eyes: ABSENT: visual disturbances


Ears: ABSENT: hearing changes


Nose, Mouth, and Throat: ABSENT: sore throat


Cardiovascular: ABSENT: chest pain


Respiratory: ABSENT: cough, dyspnea


Gastrointestinal: PRESENT: abdominal pain, bloating, nausea, vomiting.  ABSENT: 

hematemesis, hematochezia, melena


Genitourinary: ABSENT: dysuria


Musculoskeletal: PRESENT: back pain


Integumentary: ABSENT: pruritus, rash


Neurological: ABSENT: confusion, convulsions, dizziness


Psychiatric: ABSENT: anxiety, depression


Endocrine: ABSENT: cold intolerance, heat intolerance


Hematologic/Lymphatic: ABSENT: easy bleeding, easy bruising





Physical Exam


Vital Signs: 


                                        











Temp Pulse Resp BP Pulse Ox


 


 98.2 F   87   18   164/82 H  98 


 


 05/14/20 00:24  05/14/20 00:24  05/14/20 00:24  05/14/20 00:24  05/14/20 00:24








                                 Intake & Output











 05/12/20 05/13/20 05/14/20





 06:59 06:59 06:59


 


Intake Total   3451


 


Balance   3451


 


Weight   115.1 kg











General appearance: PRESENT: no acute distress, cooperative, obese


Eye exam: PRESENT: EOMI, PERRLA.  ABSENT: scleral icterus


Mouth exam: PRESENT: moist, neck supple


Neck exam: ABSENT: meningismus, tenderness, thyromegaly, tracheal deviation


Respiratory exam: PRESENT: unlabored.  ABSENT: tachypnea, wheezes


Cardiovascular exam: ABSENT: tachycardia


Pulses: PRESENT: normal radial pulses


GI/Abdominal exam: PRESENT: soft.  ABSENT: distended, firm, tenderness


Rectal exam: PRESENT: deferred


Extremities exam: ABSENT: clubbing


Musculoskeletal exam: ABSENT: deformity


Neurological exam: PRESENT: alert, awake, oriented to person, oriented to place,

oriented to time, oriented to situation, CN II-XII grossly intact.  ABSENT: 

motor sensory deficit


Psychiatric exam: ABSENT: agitated, anxious, depressed


Focused psych exam: ABSENT: delusional


Skin exam: ABSENT: cyanosis, erythema, jaundice





Results


Laboratory Results: 


                                        





                                 05/14/20 05:04 





                                 05/14/20 05:04 





                                        











  05/13/20 05/13/20 05/13/20





  17:28 17:28 17:28


 


WBC  3.1 L  


 


RBC  4.23  


 


Hgb  14.1  


 


Hct  41.4  


 


MCV  98 H  


 


MCH  33.4  


 


MCHC  34.1  


 


RDW  18.4 H  


 


Plt Count  153  


 


Seg Neutrophils %  59.3  


 


Sodium   133.6 L 


 


Potassium   4.7 


 


Chloride   94 L 


 


Carbon Dioxide   19 L 


 


Anion Gap   21 H 


 


BUN   4 L 


 


Creatinine   0.58 


 


Est GFR ( Amer)   > 60 


 


Glucose   74 L 


 


Calcium   9.1 


 


Magnesium   


 


Total Bilirubin   4.7 H 


 


AST   1255 H 


 


Alkaline Phosphatase   233 H 


 


Total Protein   8.0 


 


Albumin   4.9 


 


Triglycerides   


 


Cholesterol   


 


LDL Cholesterol Direct   


 


VLDL Cholesterol   


 


HDL Cholesterol   


 


Amylase   


 


Lipase   2241.7 H 


 


Serum HCG, Qual    NEGATIVE


 


Urine Color   


 


Urine Appearance   


 


Urine pH   


 


Ur Specific Gravity   


 


Urine Protein   


 


Urine Glucose (UA)   


 


Urine Ketones   


 


Urine Blood   


 


Urine RBC (Auto)   


 


Blood Type   


 


Antibody Screen   














  05/13/20 05/13/20 05/14/20





  17:28 17:28 05:04


 


WBC    2.7 L


 


RBC    3.42 L


 


Hgb    11.6 L D


 


Hct    33.6 L


 


MCV    98 H


 


MCH    33.8 H


 


MCHC    34.5


 


RDW    18.1 H


 


Plt Count    104 L


 


Seg Neutrophils %   


 


Sodium   


 


Potassium   


 


Chloride   


 


Carbon Dioxide   


 


Anion Gap   


 


BUN   


 


Creatinine   


 


Est GFR (African Amer)   


 


Glucose   


 


Calcium   


 


Magnesium   


 


Total Bilirubin   


 


AST   


 


Alkaline Phosphatase   


 


Total Protein   


 


Albumin   


 


Triglycerides   


 


Cholesterol   


 


LDL Cholesterol Direct   


 


VLDL Cholesterol   


 


HDL Cholesterol   


 


Amylase   


 


Lipase   


 


Serum HCG, Qual   


 


Urine Color   YELLOW 


 


Urine Appearance   SLIGHTLY-CLOUDY 


 


Urine pH   6.0 


 


Ur Specific Gravity   1.014 


 


Urine Protein   30 H 


 


Urine Glucose (UA)   NEGATIVE 


 


Urine Ketones   80 H 


 


Urine Blood   SMALL H 


 


Urine RBC (Auto)   0 


 


Blood Type  A POSITIVE  


 


Antibody Screen  NEGATIVE  














  05/14/20





  05:04


 


WBC 


 


RBC 


 


Hgb 


 


Hct 


 


MCV 


 


MCH 


 


MCHC 


 


RDW 


 


Plt Count 


 


Seg Neutrophils % 


 


Sodium  133.2 L


 


Potassium  4.3


 


Chloride  100


 


Carbon Dioxide  13 L


 


Anion Gap  20 H


 


BUN  2 L


 


Creatinine  0.53


 


Est GFR (African Amer)  > 60


 


Glucose  33 L*


 


Calcium  8.2 L


 


Magnesium  1.6


 


Total Bilirubin  4.8 H


 


AST  941 H


 


Alkaline Phosphatase  175 H


 


Total Protein  6.3


 


Albumin  3.8


 


Triglycerides  81


 


Cholesterol  232.69 H


 


LDL Cholesterol Direct  84


 


VLDL Cholesterol  16.2


 


HDL Cholesterol  127


 


Amylase  117 H


 


Lipase  2009.8 H


 


Serum HCG, Qual 


 


Urine Color 


 


Urine Appearance 


 


Urine pH 


 


Ur Specific Gravity 


 


Urine Protein 


 


Urine Glucose (UA) 


 


Urine Ketones 


 


Urine Blood 


 


Urine RBC (Auto) 


 


Blood Type 


 


Antibody Screen 








                                        











  05/13/20





  17:28


 


Troponin I  0.019











Impressions: 


                                        





Abdomen/Pelvis CT  05/13/20 00:00


IMPRESSION:


 


1.  Diffuse hepatic steatosis; please correlate with liver


function tests to exclude steatohepatitis.


2.  Query mild inflammatory stranding of the pancreatic head


which may represent pancreatitis.


 








Chest X-Ray  05/13/20 17:09


IMPRESSION:  NO ACUTE RADIOGRAPHIC FINDING IN THE CHEST.


 








Abdomen Ultrasound  05/13/20 18:29


IMPRESSION:  Hepatomegaly.  Hepatic steatosis.  Pancreas is slightly prominent 

but there are no obvious inflammatory changes.


 














Assessment & Plan





- Diagnosis


(1) Alcoholic pancreatitis


Qualifiers: 


   Acute pancreatitis complication: no infection or necrosis 


Is this a current diagnosis for this admission?: Yes   





(2) Alcoholic hepatitis


Qualifiers: 


   Ascites presence: without ascites   Qualified Code(s): K70.10 - Alcoholic 

hepatitis without ascites   


Is this a current diagnosis for this admission?: Yes   





- Plan Summary


Plan Summary: 





This is a 44-year-old female reports drinking heavily.  She was admitted with 

alcoholic pancreatitis and alcoholic hepatitis.  I have recommended she stop her

alcohol consumption completely.  At this time, she has no evidence of pancreatic

necrosis or infection.  Her right upper quadrant ultrasound and CT scan failed 

to show any evidence of biliary obstruction.  The patient is status post 

cholecystectomy in the remote past.  At this time, surgery is not indicated in 

this patient.  I recommend supportive care, IV fluids, pain and nausea 

medications.  Surgicalist service will sign off at this time.  Please renotify 

with any questions or concerns.

## 2020-05-15 LAB
ALBUMIN SERPL-MCNC: 3.6 G/DL (ref 3.5–5)
ALP SERPL-CCNC: 170 U/L (ref 38–126)
AMYLASE SERPL-CCNC: 268 U/L (ref 30–110)
ANION GAP SERPL CALC-SCNC: 9 MMOL/L (ref 5–19)
AST SERPL-CCNC: 706 U/L (ref 14–36)
BILIRUB DIRECT SERPL-MCNC: 5.7 MG/DL (ref 0–0.4)
BILIRUB SERPL-MCNC: 7.7 MG/DL (ref 0.2–1.3)
BUN SERPL-MCNC: 3 MG/DL (ref 7–20)
CALCIUM: 8.6 MG/DL (ref 8.4–10.2)
CHLORIDE SERPL-SCNC: 101 MMOL/L (ref 98–107)
CO2 SERPL-SCNC: 22 MMOL/L (ref 22–30)
ERYTHROCYTE [DISTWIDTH] IN BLOOD BY AUTOMATED COUNT: 18.2 % (ref 11.5–14)
GLUCOSE SERPL-MCNC: 119 MG/DL (ref 75–110)
HCT VFR BLD CALC: 32.2 % (ref 36–47)
HGB BLD-MCNC: 11.1 G/DL (ref 12–15.5)
MCH RBC QN AUTO: 33.6 PG (ref 27–33.4)
MCHC RBC AUTO-ENTMCNC: 34.6 G/DL (ref 32–36)
MCV RBC AUTO: 97 FL (ref 80–97)
PLATELET # BLD: 86 10^3/UL (ref 150–450)
POTASSIUM SERPL-SCNC: 3.1 MMOL/L (ref 3.6–5)
PROT SERPL-MCNC: 6.2 G/DL (ref 6.3–8.2)
RBC # BLD AUTO: 3.32 10^6/UL (ref 3.72–5.28)
WBC # BLD AUTO: 2.1 10^3/UL (ref 4–10.5)

## 2020-05-15 RX ADMIN — HEPARIN SODIUM SCH: 5000 INJECTION, SOLUTION INTRAVENOUS; SUBCUTANEOUS at 05:18

## 2020-05-15 RX ADMIN — FAMOTIDINE SCH MG: 10 INJECTION INTRAVENOUS at 11:10

## 2020-05-15 RX ADMIN — SODIUM CHLORIDE AND POTASSIUM CHLORIDE PRN MLS/HR: 9; 2.98 INJECTION, SOLUTION INTRAVENOUS at 05:16

## 2020-05-15 RX ADMIN — Medication SCH: at 13:37

## 2020-05-15 RX ADMIN — Medication SCH: at 05:18

## 2020-05-15 RX ADMIN — MORPHINE SULFATE PRN MG: 10 INJECTION INTRAMUSCULAR; INTRAVENOUS; SUBCUTANEOUS at 20:08

## 2020-05-15 RX ADMIN — METOCLOPRAMIDE SCH MG: 5 INJECTION, SOLUTION INTRAMUSCULAR; INTRAVENOUS at 08:04

## 2020-05-15 RX ADMIN — METOCLOPRAMIDE SCH MG: 5 INJECTION, SOLUTION INTRAMUSCULAR; INTRAVENOUS at 11:10

## 2020-05-15 RX ADMIN — HEPARIN SODIUM SCH: 5000 INJECTION, SOLUTION INTRAVENOUS; SUBCUTANEOUS at 13:37

## 2020-05-15 RX ADMIN — METOCLOPRAMIDE SCH MG: 5 INJECTION, SOLUTION INTRAMUSCULAR; INTRAVENOUS at 22:30

## 2020-05-15 RX ADMIN — SODIUM CHLORIDE AND POTASSIUM CHLORIDE PRN MLS/HR: 9; 2.98 INJECTION, SOLUTION INTRAVENOUS at 17:37

## 2020-05-15 RX ADMIN — METOCLOPRAMIDE SCH MG: 5 INJECTION, SOLUTION INTRAMUSCULAR; INTRAVENOUS at 15:24

## 2020-05-15 RX ADMIN — Medication SCH ML: at 22:30

## 2020-05-15 RX ADMIN — HEPARIN SODIUM SCH: 5000 INJECTION, SOLUTION INTRAVENOUS; SUBCUTANEOUS at 22:07

## 2020-05-15 RX ADMIN — MORPHINE SULFATE PRN MG: 10 INJECTION INTRAMUSCULAR; INTRAVENOUS; SUBCUTANEOUS at 02:43

## 2020-05-15 RX ADMIN — MORPHINE SULFATE PRN MG: 10 INJECTION INTRAMUSCULAR; INTRAVENOUS; SUBCUTANEOUS at 08:25

## 2020-05-15 RX ADMIN — FAMOTIDINE SCH MG: 10 INJECTION INTRAVENOUS at 22:29

## 2020-05-15 RX ADMIN — MORPHINE SULFATE PRN MG: 10 INJECTION INTRAMUSCULAR; INTRAVENOUS; SUBCUTANEOUS at 15:24

## 2020-05-15 NOTE — PDOC PROGRESS REPORT
Subjective


Progress Note for:: 05/15/20


Subjective:: 





The patient still complains of discomfort but in fact reports that it is better 

today.


Reason For Visit: 


ACUTE PANCREATITIS








Physical Exam


Vital Signs: 


                                        











Temp Pulse Resp BP Pulse Ox


 


 98.0 F   91   18   152/77 H  95 


 


 05/15/20 07:15  05/15/20 07:15  05/15/20 07:15  05/15/20 07:15  05/15/20 07:15








                                 Intake & Output











 05/14/20 05/15/20 05/16/20





 06:59 06:59 06:59


 


Intake Total 3451 4711 


 


Balance 3451 4711 


 


Weight 115.1 kg 117 kg 











General appearance: PRESENT: no acute distress, cooperative, morbidly obese, 

well-developed


Head exam: PRESENT: atraumatic, normocephalic


Eye exam: PRESENT: conjunctiva pale, scleral icterus


Ear exam: PRESENT: normal external ear exam.  ABSENT: bleeding, drainage


Mouth exam: PRESENT: moist, tongue midline


Respiratory exam: PRESENT: clear to auscultation sh[place], symmetrical, unlabored.  

ABSENT: prolonged expiratory phas, rales, rhonchi, tachypnea, wheezes


Cardiovascular exam: PRESENT: RRR, +S1, +S2.  ABSENT: diastolic murmur, 

irregular rhythm, systolic murmur


GI/Abdominal exam: PRESENT: diminished bowel sounds, soft.  ABSENT: distended, 

guarding, tenderness


Rectal exam: PRESENT: deferred


Extremities exam: ABSENT: pedal edema


Musculoskeletal exam: PRESENT: ambulatory, normal inspection.  ABSENT: deformity


Neurological exam: PRESENT: alert, awake, oriented to person, oriented to place,

oriented to time, oriented to situation, CN II-XII grossly intact.  ABSENT: 

altered


Psychiatric exam: PRESENT: appropriate affect.  ABSENT: agitated, anxious


Focused psych exam: ABSENT: delusional, paranoid, restlessness





Results


Laboratory Results: 


                                        





                                 05/15/20 05:13 





                                 05/15/20 05:13 





                                        











  05/15/20 05/15/20





  05:13 05:13


 


WBC  2.1 L 


 


RBC  3.32 L 


 


Hgb  11.1 L 


 


Hct  32.2 L 


 


MCV  97 


 


MCH  33.6 H 


 


MCHC  34.6 


 


RDW  18.2 H 


 


Plt Count  86 L 


 


Sodium   132.0 L


 


Potassium   3.1 L


 


Chloride   101


 


Carbon Dioxide   22


 


Anion Gap   9


 


BUN   3 L


 


Creatinine   0.45 L


 


Est GFR (African Amer)   > 60


 


Glucose   119 H


 


Calcium   8.6


 


Magnesium   1.4 L


 


Total Bilirubin   7.7 H


 


AST   706 H


 


Alkaline Phosphatase   170 H


 


Total Protein   6.2 L


 


Albumin   3.6


 


Amylase   268 H


 


Lipase   4445.2 H








                                        











  05/13/20





  17:28


 


Troponin I  0.019











Impressions: 


                                        





Abdomen/Pelvis CT  05/13/20 00:00


IMPRESSION:


 


1.  Diffuse hepatic steatosis; please correlate with liver


function tests to exclude steatohepatitis.


2.  Query mild inflammatory stranding of the pancreatic head


which may represent pancreatitis.


 








Chest X-Ray  05/13/20 17:09


IMPRESSION:  NO ACUTE RADIOGRAPHIC FINDING IN THE CHEST.


 








Abdomen Ultrasound  05/13/20 18:29


IMPRESSION:  Hepatomegaly.  Hepatic steatosis.  Pancreas is slightly prominent 

but there are no obvious inflammatory changes.


 














Assessment and Plan





- Diagnosis


(1) Abdominal pain


Qualifiers: 


   Abdominal location: upper abdomen, unspecified   Qualified Code(s): R10.10 - 

Upper abdominal pain, unspecified   


Is this a current diagnosis for this admission?: Yes   





(2) Acute pancreatitis


Qualifiers: 


   Pancreatitis type: alcohol induced   Acute pancreatitis complication: 

unspecified   Qualified Code(s): K85.20 - Alcohol induced acute pancreatitis 

without necrosis or infection   


Is this a current diagnosis for this admission?: Yes   





(3) Alcoholic hepatitis


Qualifiers: 


   Ascites presence: without ascites   Qualified Code(s): K70.10 - Alcoholic 

hepatitis without ascites   


Is this a current diagnosis for this admission?: Yes   





(4) Elevated LFTs


Is this a current diagnosis for this admission?: Yes   





(5) Hypoglycemia


Is this a current diagnosis for this admission?: Yes   





(6) Hyponatremia


Is this a current diagnosis for this admission?: Yes   





(7) Morbid obesity with BMI of 40.0-44.9, adult


Is this a current diagnosis for this admission?: Yes   





(8) Alcohol dependence


Qualifiers: 


   Substance use status: unspecified alcohol-induced disorder   Qualified 

Code(s): F10.29 - Alcohol dependence with unspecified alcohol-induced disorder  




Is this a current diagnosis for this admission?: Yes   





- Plan Summary


Summary: 


Patient will be admitted to the medical floor where she will receive routine 

supportive and symptomatic cares.  She will be treated with IV fluids using LR 

at 167 mL/h.  She will receive morphine sulfate 2 to 4 mg IV every 2 hours on an

as-needed basis for pain control using a sliding scale for dosing.  She will 

receive Ativan 1 mg IV every 4 hours as needed for anxiety or restlessness.  A 

surgical consultation with Dr. Cerrato will be obtained on a routine basis.  

CBCs, metabolic profiles, magnesium levels, lipase levels, liver function 

studies, lipid profiles and thyroid studies will be obtained as appropriate.  

Patient will initially be on a clear liquid diet with diet advancement based 

upon her tolerance.





5/15/2020


The patient is feeling better despite her enzymes increasing.  She has some 

appetite.


My concern is that with increasing enzymes her continued eating will worsen the 

disease.  If the enzymes continue to increase or if her condition deteriorates 

we will make her strict n.p.o. and increase IV fluids.


No evidence of alcohol withdrawal at this time.


Hyponatremia is likely due to chronic liver disease.


I am not sure of the hypoglycemia as both chronic pancreatic disease typically 

hyperglycemia is noted.  We will monitor closely.


No acute intervention for her morbid obesity at this time.  We will continue to 

encourage alcohol cessation.





- Time


Time Spent with patient: 15-24 minutes


Medications reviewed and adjusted accordingly: Yes


Anticipated discharge: Home


Within: Other - Unknown

## 2020-05-16 LAB
ALBUMIN SERPL-MCNC: 3.4 G/DL (ref 3.5–5)
ALP SERPL-CCNC: 168 U/L (ref 38–126)
AMYLASE SERPL-CCNC: 154 U/L (ref 30–110)
ANION GAP SERPL CALC-SCNC: 10 MMOL/L (ref 5–19)
AST SERPL-CCNC: 351 U/L (ref 14–36)
BILIRUB DIRECT SERPL-MCNC: 5.1 MG/DL (ref 0–0.4)
BILIRUB SERPL-MCNC: 6.8 MG/DL (ref 0.2–1.3)
BUN SERPL-MCNC: 2 MG/DL (ref 7–20)
CALCIUM: 8.1 MG/DL (ref 8.4–10.2)
CHLORIDE SERPL-SCNC: 103 MMOL/L (ref 98–107)
CO2 SERPL-SCNC: 21 MMOL/L (ref 22–30)
ERYTHROCYTE [DISTWIDTH] IN BLOOD BY AUTOMATED COUNT: 18.6 % (ref 11.5–14)
GLUCOSE SERPL-MCNC: 103 MG/DL (ref 75–110)
HCT VFR BLD CALC: 34.4 % (ref 36–47)
HGB BLD-MCNC: 11.9 G/DL (ref 12–15.5)
MCH RBC QN AUTO: 34.1 PG (ref 27–33.4)
MCHC RBC AUTO-ENTMCNC: 34.5 G/DL (ref 32–36)
MCV RBC AUTO: 99 FL (ref 80–97)
PLATELET # BLD: 74 10^3/UL (ref 150–450)
POTASSIUM SERPL-SCNC: 3.8 MMOL/L (ref 3.6–5)
PROT SERPL-MCNC: 5.8 G/DL (ref 6.3–8.2)
RBC # BLD AUTO: 3.48 10^6/UL (ref 3.72–5.28)
WBC # BLD AUTO: 2.2 10^3/UL (ref 4–10.5)

## 2020-05-16 RX ADMIN — POTASSIUM CHLORIDE SCH MEQ: 750 TABLET, FILM COATED, EXTENDED RELEASE ORAL at 10:40

## 2020-05-16 RX ADMIN — Medication SCH ML: at 06:13

## 2020-05-16 RX ADMIN — SODIUM CHLORIDE AND POTASSIUM CHLORIDE PRN MLS/HR: 9; 2.98 INJECTION, SOLUTION INTRAVENOUS at 08:03

## 2020-05-16 RX ADMIN — Medication SCH ML: at 21:12

## 2020-05-16 RX ADMIN — METOCLOPRAMIDE SCH MG: 5 INJECTION, SOLUTION INTRAMUSCULAR; INTRAVENOUS at 16:46

## 2020-05-16 RX ADMIN — MORPHINE SULFATE PRN MG: 10 INJECTION INTRAMUSCULAR; INTRAVENOUS; SUBCUTANEOUS at 04:34

## 2020-05-16 RX ADMIN — METOCLOPRAMIDE SCH MG: 5 INJECTION, SOLUTION INTRAMUSCULAR; INTRAVENOUS at 10:41

## 2020-05-16 RX ADMIN — FAMOTIDINE SCH MG: 10 INJECTION INTRAVENOUS at 21:11

## 2020-05-16 RX ADMIN — METOCLOPRAMIDE SCH MG: 5 INJECTION, SOLUTION INTRAMUSCULAR; INTRAVENOUS at 08:03

## 2020-05-16 RX ADMIN — METOCLOPRAMIDE SCH MG: 5 INJECTION, SOLUTION INTRAMUSCULAR; INTRAVENOUS at 21:11

## 2020-05-16 RX ADMIN — HEPARIN SODIUM SCH: 5000 INJECTION, SOLUTION INTRAVENOUS; SUBCUTANEOUS at 06:14

## 2020-05-16 RX ADMIN — SODIUM CHLORIDE AND POTASSIUM CHLORIDE PRN MLS/HR: 9; 2.98 INJECTION, SOLUTION INTRAVENOUS at 22:00

## 2020-05-16 RX ADMIN — MORPHINE SULFATE PRN MG: 10 INJECTION INTRAMUSCULAR; INTRAVENOUS; SUBCUTANEOUS at 16:51

## 2020-05-16 RX ADMIN — Medication SCH: at 14:23

## 2020-05-16 RX ADMIN — FAMOTIDINE SCH MG: 10 INJECTION INTRAVENOUS at 10:41

## 2020-05-16 RX ADMIN — HEPARIN SODIUM SCH: 5000 INJECTION, SOLUTION INTRAVENOUS; SUBCUTANEOUS at 14:22

## 2020-05-16 RX ADMIN — MORPHINE SULFATE PRN MG: 10 INJECTION INTRAMUSCULAR; INTRAVENOUS; SUBCUTANEOUS at 08:43

## 2020-05-16 RX ADMIN — HEPARIN SODIUM SCH: 5000 INJECTION, SOLUTION INTRAVENOUS; SUBCUTANEOUS at 21:12

## 2020-05-16 RX ADMIN — SODIUM CHLORIDE AND POTASSIUM CHLORIDE PRN MLS/HR: 9; 2.98 INJECTION, SOLUTION INTRAVENOUS at 01:22

## 2020-05-16 RX ADMIN — MORPHINE SULFATE PRN MG: 10 INJECTION INTRAMUSCULAR; INTRAVENOUS; SUBCUTANEOUS at 20:52

## 2020-05-16 RX ADMIN — SODIUM CHLORIDE AND POTASSIUM CHLORIDE PRN MLS/HR: 9; 2.98 INJECTION, SOLUTION INTRAVENOUS at 16:46

## 2020-05-16 NOTE — PDOC PROGRESS REPORT
Subjective


Progress Note for:: 05/16/20


Subjective:: 





Her appetite is still poor but she is again feeling slightly better.  Her 

chemistries are significantly improved.


Reason For Visit: 


ACUTE PANCREATITIS








Physical Exam


Vital Signs: 


                                        











Temp Pulse Resp BP Pulse Ox


 


 98.0 F   83   16   143/78 H  99 


 


 05/16/20 07:21  05/16/20 07:21  05/16/20 07:21  05/16/20 07:21  05/16/20 07:21








                                 Intake & Output











 05/15/20 05/16/20 05/17/20





 06:59 06:59 06:59


 


Intake Total 4711 3595 


 


Balance 4711 3595 


 


Weight 117 kg 118.1 kg 











General appearance: PRESENT: no acute distress, cooperative, morbidly obese, 

well-developed


Head exam: PRESENT: atraumatic, normocephalic


Eye exam: PRESENT: conjunctiva pink, EOMI, scleral icterus


Ear exam: PRESENT: normal external ear exam.  ABSENT: bleeding, drainage


Mouth exam: PRESENT: moist, tongue midline


Respiratory exam: PRESENT: clear to auscultation [place], symmetrical, unlabored.  

ABSENT: prolonged expiratory phas, rales, rhonchi, tachypnea, wheezes


Cardiovascular exam: PRESENT: RRR, +S1, +S2, other - S3.  ABSENT: diastolic 

murmur, irregular rhythm, systolic murmur


GI/Abdominal exam: PRESENT: normal bowel sounds, soft.  ABSENT: distended, 

guarding, tenderness


Rectal exam: PRESENT: deferred


Gentrourinary exam: ABSENT: indwelling catheter


Extremities exam: ABSENT: pedal edema


Musculoskeletal exam: PRESENT: ambulatory, normal inspection.  ABSENT: deformity


Neurological exam: PRESENT: alert, awake, oriented to person, oriented to place,

oriented to time, oriented to situation, CN II-XII grossly intact.  ABSENT: 

altered, motor sensory deficit


Psychiatric exam: PRESENT: appropriate affect, normal mood.  ABSENT: agitated, 

anxious


Focused psych exam: ABSENT: delusional, paranoid, restlessness


Skin exam: PRESENT: dry, normal color, warm.  ABSENT: rash





Results


Laboratory Results: 


                                        





                                 05/16/20 05:28 





                                 05/16/20 07:30 





                                        











  05/16/20 05/16/20 05/16/20





  05:28 05:28 07:30


 


WBC  2.2 L  


 


RBC  3.48 L  


 


Hgb  11.9 L  


 


Hct  34.4 L  


 


MCV  99 H  


 


MCH  34.1 H  


 


MCHC  34.5  


 


RDW  18.6 H  


 


Plt Count  74 L  


 


Sodium   Cancelled  134.4 L


 


Potassium   Cancelled  3.8


 


Chloride   Cancelled  103


 


Carbon Dioxide   Cancelled  21 L


 


Anion Gap   Cancelled  10


 


BUN   Cancelled  2 L


 


Creatinine   Cancelled  0.42 L


 


Est GFR ( Amer)   Cancelled  > 60


 


Est GFR (Non-Af Amer)   Cancelled 


 


Glucose   Cancelled  103


 


Calcium   Cancelled  8.1 L


 


Magnesium   Cancelled  1.4 L


 


Total Bilirubin   Cancelled  6.8 H


 


AST   Cancelled  351 H


 


Alkaline Phosphatase   Cancelled  168 H


 


Total Protein   Cancelled  5.8 L


 


Albumin   Cancelled  3.4 L


 


Amylase   Cancelled  154 H


 


Lipase   Cancelled  2144.1 H








                                        











  05/13/20





  17:28


 


Troponin I  0.019











Impressions: 


                                        





Abdomen/Pelvis CT  05/13/20 00:00


IMPRESSION:


 


1.  Diffuse hepatic steatosis; please correlate with liver


function tests to exclude steatohepatitis.


2.  Query mild inflammatory stranding of the pancreatic head


which may represent pancreatitis.


 








Chest X-Ray  05/13/20 17:09


IMPRESSION:  NO ACUTE RADIOGRAPHIC FINDING IN THE CHEST.


 








Abdomen Ultrasound  05/13/20 18:29


IMPRESSION:  Hepatomegaly.  Hepatic steatosis.  Pancreas is slightly prominent 

but there are no obvious inflammatory changes.


 














Assessment and Plan





- Diagnosis


(1) Abdominal pain


Qualifiers: 


   Abdominal location: upper abdomen, unspecified   Qualified Code(s): R10.10 - 

Upper abdominal pain, unspecified   


Is this a current diagnosis for this admission?: Yes   





(2) Acute pancreatitis


Qualifiers: 


   Pancreatitis type: alcohol induced   Acute pancreatitis complication: 

unspecified   Qualified Code(s): K85.20 - Alcohol induced acute pancreatitis 

without necrosis or infection   


Is this a current diagnosis for this admission?: Yes   





(3) Alcoholic hepatitis


Qualifiers: 


   Ascites presence: without ascites   Qualified Code(s): K70.10 - Alcoholic 

hepatitis without ascites   


Is this a current diagnosis for this admission?: Yes   





(4) Elevated LFTs


Is this a current diagnosis for this admission?: Yes   





(5) Hypoglycemia


Is this a current diagnosis for this admission?: Yes   





(6) Hyponatremia


Is this a current diagnosis for this admission?: Yes   





(7) Morbid obesity with BMI of 40.0-44.9, adult


Is this a current diagnosis for this admission?: Yes   





(8) Alcohol dependence


Qualifiers: 


   Substance use status: unspecified alcohol-induced disorder   Qualified 

Code(s): F10.29 - Alcohol dependence with unspecified alcohol-induced disorder  




Is this a current diagnosis for this admission?: Yes   





- Plan Summary


Summary: 


Patient will be admitted to the medical floor where she will receive routine 

supportive and symptomatic cares.  She will be treated with IV fluids using LR 

at 167 mL/h.  She will receive morphine sulfate 2 to 4 mg IV every 2 hours on an

as-needed basis for pain control using a sliding scale for dosing.  She will 

receive Ativan 1 mg IV every 4 hours as needed for anxiety or restlessness.  A 

surgical consultation with Dr. Cerrato will be obtained on a routine basis.  

CBCs, metabolic profiles, magnesium levels, lipase levels, liver function s

tudies, lipid profiles and thyroid studies will be obtained as appropriate.  

Patient will initially be on a clear liquid diet with diet advancement based 

upon her tolerance.





5/15/2020


The patient is feeling better despite her enzymes increasing.  She has some 

appetite.


My concern is that with increasing enzymes her continued eating will worsen the 

disease.  If the enzymes continue to increase or if her condition deteriorates 

we will make her strict n.p.o. and increase IV fluids.


No evidence of alcohol withdrawal at this time.


Hyponatremia is likely due to chronic liver disease.


I am not sure of the hypoglycemia as both chronic pancreatic disease typically 

hyperglycemia is noted.  We will monitor closely.


No acute intervention for her morbid obesity at this time.  We will continue to 

encourage alcohol cessation.





5/16/2020


Serum chemistries are significantly improved from her last blood work.  We will 

continue her diet.


She is not having significant pain at this time.


We will continue the IV fluids.  I will repeat her blood work tomorrow.  If 

there is ongoing improvement she will be able to discharged home.





- Time


Time Spent with patient: Less than 15 minutes


Medications reviewed and adjusted accordingly: Yes


Anticipated discharge: Home


Within: within 48 hours

## 2020-05-17 VITALS — SYSTOLIC BLOOD PRESSURE: 158 MMHG | DIASTOLIC BLOOD PRESSURE: 89 MMHG

## 2020-05-17 RX ADMIN — POTASSIUM CHLORIDE SCH MEQ: 750 TABLET, FILM COATED, EXTENDED RELEASE ORAL at 09:20

## 2020-05-17 RX ADMIN — HEPARIN SODIUM SCH: 5000 INJECTION, SOLUTION INTRAVENOUS; SUBCUTANEOUS at 05:18

## 2020-05-17 RX ADMIN — SODIUM CHLORIDE AND POTASSIUM CHLORIDE PRN MLS/HR: 9; 2.98 INJECTION, SOLUTION INTRAVENOUS at 09:17

## 2020-05-17 RX ADMIN — FAMOTIDINE SCH MG: 10 INJECTION INTRAVENOUS at 09:20

## 2020-05-17 RX ADMIN — MORPHINE SULFATE PRN MG: 10 INJECTION INTRAMUSCULAR; INTRAVENOUS; SUBCUTANEOUS at 02:17

## 2020-05-17 RX ADMIN — Medication SCH ML: at 06:51

## 2020-05-17 RX ADMIN — METOCLOPRAMIDE SCH MG: 5 INJECTION, SOLUTION INTRAMUSCULAR; INTRAVENOUS at 07:53

## 2020-05-17 RX ADMIN — Medication SCH: at 13:52

## 2020-05-17 RX ADMIN — HEPARIN SODIUM SCH: 5000 INJECTION, SOLUTION INTRAVENOUS; SUBCUTANEOUS at 13:52

## 2020-05-17 RX ADMIN — MORPHINE SULFATE PRN MG: 10 INJECTION INTRAMUSCULAR; INTRAVENOUS; SUBCUTANEOUS at 07:59

## 2020-05-17 RX ADMIN — METOCLOPRAMIDE SCH: 5 INJECTION, SOLUTION INTRAMUSCULAR; INTRAVENOUS at 11:47

## 2020-05-17 RX ADMIN — SODIUM CHLORIDE AND POTASSIUM CHLORIDE PRN MLS/HR: 9; 2.98 INJECTION, SOLUTION INTRAVENOUS at 02:39

## 2020-05-17 NOTE — PDOC DISCHARGE SUMMARY
Impression





- Admit/DC Date/PCP


Admission Date/Primary Care Provider: 


  05/13/20 20:00





  





Discharge Date: 05/17/20





- Discharge Diagnosis


(1) Abdominal pain


Is this a current diagnosis for this admission?: Yes   





(2) Acute pancreatitis


Is this a current diagnosis for this admission?: Yes   





(3) Alcoholic hepatitis


Is this a current diagnosis for this admission?: Yes   





(4) Elevated LFTs


Is this a current diagnosis for this admission?: Yes   





(5) Hypoglycemia


Is this a current diagnosis for this admission?: Yes   





(6) Hyponatremia


Is this a current diagnosis for this admission?: Yes   





(7) Morbid obesity with BMI of 40.0-44.9, adult


Is this a current diagnosis for this admission?: Yes   





(8) Alcohol dependence


Is this a current diagnosis for this admission?: Yes   





- Assessment


Summary: 


Patient will be admitted to the medical floor where she will receive routine 

supportive and symptomatic cares.  She will be treated with IV fluids using LR 

at 167 mL/h.  She will receive morphine sulfate 2 to 4 mg IV every 2 hours on an

as-needed basis for pain control using a sliding scale for dosing.  She will 

receive Ativan 1 mg IV every 4 hours as needed for anxiety or restlessness.  A 

surgical consultation with Dr. Cerrato will be obtained on a routine basis.  

CBCs, metabolic profiles, magnesium levels, lipase levels, liver function 

studies, lipid profiles and thyroid studies will be obtained as appropriate.  

Patient will initially be on a clear liquid diet with diet advancement based 

upon her tolerance.





5/15/2020


The patient is feeling better despite her enzymes increasing.  She has some 

appetite.


My concern is that with increasing enzymes her continued eating will worsen the 

disease.  If the enzymes continue to increase or if her condition deteriorates 

we will make her strict n.p.o. and increase IV fluids.


No evidence of alcohol withdrawal at this time.


Hyponatremia is likely due to chronic liver disease.


I am not sure of the hypoglycemia as both chronic pancreatic disease typically 

hyperglycemia is noted.  We will monitor closely.


No acute intervention for her morbid obesity at this time.  We will continue to 

encourage alcohol cessation.





5/16/2020


Serum chemistries are significantly improved from her last blood work.  We will 

continue her diet.


She is not having significant pain at this time.


We will continue the IV fluids.  I will repeat her blood work tomorrow.  If 

there is ongoing improvement she will be able to discharged home.





- Additional Information


Resuscitation Status: Full Code


Discharge Diet: Regular


Discharge Activity: Activity As Tolerated


Referrals: 


NANCY ENGEL DO [NO LOCAL MD] -  (For follow-up and to establish for primary 

care)


Prescriptions: 


Potassium Chloride [Klor-Con 10 Meq Tablet ER] 10 meq PO DAILY 7 Days #7 

tablet.er


Hydrocodone/Acetaminophen [Norco 5-325 mg Tablet] 1 tab PO Q6 PRN #6 tablet


 PRN Reason: For Pain


Home Medications: 








Hydrocodone/Acetaminophen [Norco 5-325 mg Tablet] 1 tab PO Q6 PRN #6 tablet 

05/17/20 


Potassium Chloride [Klor-Con 10 Meq Tablet ER] 10 meq PO DAILY 7 Days #7 

tablet.er 05/17/20 











History of Present Illiness


History of Present Illness: 


JUSTA GRIFFIN is a 44 year old female who presents to the emergency room with a 

2-week history of abdominal pain.  She admits the gradual onset and gradual wo

rsening of intermittent sharp crampy upper abdominal pain over the last 2 weeks,

with the pain becoming severe today.  Her abdominal pain has been accompanied by

dyspnea and associated with hard dark stools, anorexia, dizziness, nausea, dry 

heaves, pharyngitis, dyspepsia and dysgeusia.  She denies other associated or 

accompanying signs and symptoms.  Her pain and other symptoms are worsened by 

attempts at oral intake.  She admits prior similar episodes related to recurrent

anemia.  She has not identified any additional aggravating or ameliorating 

factors for her abdominal pain.  In the emergency room she was found to have an 

elevated lipase as well as elevated liver enzymes with no evidence of biliary 

obstruction on an upper abdominal ultrasound.  She was subsequently admitted to 

the hospital for further evaluation treatment.








Hospital Course


Hospital Course: 


The patient had an uncomplicated but prolonged hospital course.  Due to the 

significant elevation of her lipase and hepatic enzymes as well as ongoing 

discomfort risks of several days for her to achieve stable status.  Electrolytes

were corrected.  She has a decent appetite, has minimal pain and her enzymes 

continue to decrease.  She is stable for discharge.





Physical Exam


Vital Signs: 


                                        











Temp Pulse Resp BP Pulse Ox


 


 97.7 F   83   17   158/89 H  100 


 


 05/17/20 08:00  05/17/20 08:00  05/17/20 08:00  05/17/20 08:00  05/17/20 08:00








                                 Intake & Output











 05/16/20 05/17/20 05/18/20





 06:59 06:59 06:59


 


Intake Total 3597 4474 1000


 


Balance 3595 4429 1000


 


Weight 118.1 kg 118.1 kg 











General appearance: PRESENT: no acute distress, cooperative, morbidly obese, 

well-developed, well-nourished


Head exam: PRESENT: atraumatic


Respiratory exam: PRESENT: clear to auscultation sh[place], symmetrical, unlabored.  

ABSENT: prolonged expiratory phas, rales, rhonchi, tachypnea, wheezes


Cardiovascular exam: PRESENT: RRR, +S1, +S2.  ABSENT: irregular rhythm, 

tachycardia


GI/Abdominal exam: PRESENT: normal bowel sounds, soft.  ABSENT: distended, 

guarding, tenderness


Extremities exam: ABSENT: pedal edema


Musculoskeletal exam: PRESENT: ambulatory, normal inspection


Neurological exam: PRESENT: alert, awake, oriented to person, oriented to place,

oriented to time, oriented to situation, CN II-XII grossly intact.  ABSENT: a

ltered, motor sensory deficit


Psychiatric exam: ABSENT: agitated, anxious





Results


Laboratory Results: 


                                        











WBC  2.2 10^3/uL (4.0-10.5)  L  05/16/20  05:28    


 


RBC  3.48 10^6/uL (3.72-5.28)  L  05/16/20  05:28    


 


Hgb  11.9 g/dL (12.0-15.5)  L  05/16/20  05:28    


 


Hct  34.4 % (36.0-47.0)  L  05/16/20  05:28    


 


MCV  99 fl (80-97)  H  05/16/20  05:28    


 


MCH  34.1 pg (27.0-33.4)  H  05/16/20  05:28    


 


MCHC  34.5 g/dL (32.0-36.0)   05/16/20  05:28    


 


RDW  18.6 % (11.5-14.0)  H  05/16/20  05:28    


 


Plt Count  74 10^3/uL (150-450)  L  05/16/20  05:28    


 


Lymph % (Auto)  27.6 % (13-45)   05/13/20  17:28    


 


Mono % (Auto)  10.1 % (3-13)   05/13/20  17:28    


 


Eos % (Auto)  0.5 % (0-6)   05/13/20  17:28    


 


Baso % (Auto)  2.5 % (0-2)  H  05/13/20  17:28    


 


Absolute Neuts (auto)  1.8 10^3/uL (1.7-8.2)   05/13/20  17:28    


 


Absolute Lymphs (auto)  0.9 10^3/uL (0.5-4.7)   05/13/20 17:28    


 


Absolute Monos (auto)  0.3 10^3/uL (0.1-1.4)   05/13/20  17:28    


 


Absolute Eos (auto)  0.0 10^3/uL (0.0-0.6)   05/13/20 17:28    


 


Absolute Basos (auto)  0.1 10^3/uL (0.0-0.2)   05/13/20  17:28    


 


Seg Neutrophils %  59.3 % (42-78)   05/13/20  17:28    


 


PT  13.5 SEC (11.4-15.4)   05/14/20  09:52    


 


INR  1.03   05/14/20  09:52    


 


APTT  23.0 SEC (23.5-35.8)  L  05/14/20  09:52    


 


Sodium  134.4 mmol/L (137-145)  L  05/16/20  07:30    


 


Potassium  3.8 mmol/L (3.6-5.0)   05/16/20  07:30    


 


Chloride  103 mmol/L ()   05/16/20  07:30    


 


Carbon Dioxide  21 mmol/L (22-30)  L  05/16/20  07:30    


 


Anion Gap  10  (5-19)   05/16/20  07:30    


 


BUN  2 mg/dL (7-20)  L  05/16/20  07:30    


 


Creatinine  0.42 mg/dL (0.52-1.25)  L  05/16/20  07:30    


 


Est GFR ( Amer)  > 60  (>60)   05/16/20  07:30    


 


Est GFR (Non-Af Amer)  Cancelled   05/16/20  05:28    


 


Est GFR (MDRD) Non-Af  > 60  (>60)   05/16/20  07:30    


 


Glucose  103 mg/dL ()   05/16/20  07:30    


 


POC Glucose  111 mg/dL ()  H  05/17/20  11:23    


 


Hemoglobin A1c %  4.5 % (4.7-6.0)  L  05/14/20  05:04    


 


Calcium  8.1 mg/dL (8.4-10.2)  L  05/16/20  07:30    


 


Magnesium  1.4 mg/dL (1.6-2.3)  L  05/16/20  07:30    


 


Total Bilirubin  6.8 mg/dL (0.2-1.3)  H  05/16/20  07:30    


 


Direct Bilirubin  5.1 mg/dL (0.0-0.4)  H  05/16/20  07:30    


 


Neonat Total Bilirubin  Not Reportable   05/16/20  07:30    


 


Neonat Direct Bilirubin  Not Reportable   05/16/20  07:30    


 


Neonat Indirect Bili  Not Reportable   05/16/20  07:30    


 


AST  351 U/L (14-36)  H  05/16/20  07:30    


 


ALT  246 U/L (<35)  H  05/16/20  07:30    


 


Alkaline Phosphatase  168 U/L ()  H  05/16/20  07:30    


 


Troponin I  0.019 ng/mL  05/13/20  17:28    


 


Total Protein  5.8 g/dL (6.3-8.2)  L  05/16/20  07:30    


 


Albumin  3.4 g/dL (3.5-5.0)  L  05/16/20  07:30    


 


Triglycerides  81 mg/dL (<150)   05/14/20  05:04    


 


Cholesterol  232.69 mg/dL (0-200)  H  05/14/20  05:04    


 


LDL Cholesterol Direct  84 mg/dL (<100)   05/14/20  05:04    


 


VLDL Cholesterol  16.2 mg/dL (10-31)   05/14/20  05:04    


 


HDL Cholesterol  127 mg/dL (>40)   05/14/20  05:04    


 


Amylase  154 U/L ()  H  05/16/20  07:30    


 


Lipase  2144.1 U/L ()  H  05/16/20  07:30    


 


EGFR   Cancelled   05/16/20  05:28    


 


TSH  4.54 uIU/mL (0.47-4.68)   05/14/20  05:04    


 


Free T3 pg/mL  3.02 pg/mL (2.77-5.27)   05/14/20  05:04    


 


Serum HCG, Qual  NEGATIVE  (NEGATIVE)   05/13/20  17:28    


 


Urine Color  YELLOW   05/13/20  17:28    


 


Urine Appearance  SLIGHTLY-CLOUDY   05/13/20  17:28    


 


Urine pH  6.0  (5.0-9.0)   05/13/20  17:28    


 


Ur Specific Gravity  1.014   05/13/20  17:28    


 


Urine Protein  30 mg/dL (NEGATIVE)  H  05/13/20  17:28    


 


Urine Glucose (UA)  NEGATIVE mg/dL (NEGATIVE)   05/13/20  17:28    


 


Urine Ketones  80 mg/dL (NEGATIVE)  H  05/13/20  17:28    


 


Urine Blood  SMALL  (NEGATIVE)  H  05/13/20  17:28    


 


Urine Nitrite (Reflex)  NEGATIVE  (NEGATIVE)   05/13/20  17:28    


 


Urine Bilirubin  SMALL  (NEGATIVE)  H  05/13/20  17:28    


 


Urine Urobilinogen  4.0 mg/dL (<2.0)  H  05/13/20  17:28    


 


Leukocyte Esterase Rfl  NEGATIVE  (NEGATIVE)   05/13/20  17:28    


 


Urine RBC (Auto)  0 /HPF  05/13/20  17:28    


 


U Hyaline Cast (Auto)  14 /LPF  05/13/20  17:28    


 


Urine Bacteria (Auto)  TRACE /HPF  05/13/20  17:28    


 


Urine WBC (Reflex)  2 /HPF  05/13/20  17:28    


 


Squamous Epi Cells Auto  15 /HPF  05/13/20  17:28    


 


Urine Mucus (Auto)  MOD /LPF  05/13/20  17:28    


 


Urine Ascorbic Acid  NEGATIVE  (NEGATIVE)   05/13/20  17:28    


 


POC Stool Occult Blood  NEGATIVE  (NEGATIVE)   05/13/20  17:44    


 


Blood Type  A POSITIVE   05/13/20  17:28    


 


Antibody Screen  NEGATIVE   05/13/20  17:28    








                                        











  05/13/20





  17:28


 


Troponin I  0.019











Impressions: 


                                        





Abdomen/Pelvis CT  05/13/20 00:00


IMPRESSION:


 


1.  Diffuse hepatic steatosis; please correlate with liver


function tests to exclude steatohepatitis.


2.  Query mild inflammatory stranding of the pancreatic head


which may represent pancreatitis.


 








Chest X-Ray  05/13/20 17:09


IMPRESSION:  NO ACUTE RADIOGRAPHIC FINDING IN THE CHEST.


 








Abdomen Ultrasound  05/13/20 18:29


IMPRESSION:  Hepatomegaly.  Hepatic steatosis.  Pancreas is slightly prominent 

but there are no obvious inflammatory changes.


 














Plan


Health Concerns: 


There is an absolute necessity to stop alcohol intake


Plan of Treatment: 


Slowly advance diet.  Back to work on Wednesday.  She will establish with a 

primary care in [place].


Goals: 


Cessation of alcohol use.


Healing of liver and pancreas.


Long-term goal-weight loss


Time Spent: Greater than 30 Minutes





Stroke


Is this a Stroke Patient?: No





Acute Heart Failure





- **


Is this a Heart Failure Patient?: No

## 2021-01-27 ENCOUNTER — HOSPITAL ENCOUNTER (OUTPATIENT)
Dept: HOSPITAL 62 - OROUT | Age: 46
LOS: 1 days | End: 2021-01-28
Payer: COMMERCIAL

## 2021-01-27 DIAGNOSIS — Z52.4: Primary | ICD-10-CM

## 2021-01-27 PROCEDURE — 87040 BLOOD CULTURE FOR BACTERIA: CPT

## 2021-01-27 PROCEDURE — 87070 CULTURE OTHR SPECIMN AEROBIC: CPT

## 2021-01-27 PROCEDURE — 87086 URINE CULTURE/COLONY COUNT: CPT

## 2021-01-27 PROCEDURE — 87088 URINE BACTERIA CULTURE: CPT

## 2021-01-27 PROCEDURE — 86900 BLOOD TYPING SEROLOGIC ABO: CPT

## 2021-01-27 PROCEDURE — 94003 VENT MGMT INPAT SUBQ DAY: CPT

## 2021-01-27 PROCEDURE — 86901 BLOOD TYPING SEROLOGIC RH(D): CPT

## 2021-01-27 PROCEDURE — 85730 THROMBOPLASTIN TIME PARTIAL: CPT

## 2021-01-27 PROCEDURE — 81001 URINALYSIS AUTO W/SCOPE: CPT

## 2021-01-27 PROCEDURE — 36415 COLL VENOUS BLD VENIPUNCTURE: CPT

## 2021-01-27 PROCEDURE — 85610 PROTHROMBIN TIME: CPT

## 2021-01-27 PROCEDURE — 87186 SC STD MICRODIL/AGAR DIL: CPT

## 2021-01-27 PROCEDURE — 71045 X-RAY EXAM CHEST 1 VIEW: CPT

## 2021-01-27 PROCEDURE — 84100 ASSAY OF PHOSPHORUS: CPT

## 2021-01-27 PROCEDURE — 82803 BLOOD GASES ANY COMBINATION: CPT

## 2021-01-27 PROCEDURE — 86850 RBC ANTIBODY SCREEN: CPT

## 2021-01-27 PROCEDURE — 87077 CULTURE AEROBIC IDENTIFY: CPT

## 2021-01-27 PROCEDURE — 85025 COMPLETE CBC W/AUTO DIFF WBC: CPT

## 2021-01-27 PROCEDURE — 83735 ASSAY OF MAGNESIUM: CPT

## 2021-01-28 VITALS — SYSTOLIC BLOOD PRESSURE: 115 MMHG | DIASTOLIC BLOOD PRESSURE: 66 MMHG

## 2021-01-28 LAB
ABSOLUTE LYMPHOCYTES# (MANUAL): 0.5 10^3/UL (ref 0.5–4.7)
ABSOLUTE MONOCYTES # (MANUAL): 0.2 10^3/UL (ref 0.1–1.4)
ADD MANUAL DIFF: YES
ALBUMIN SERPL-MCNC: 2.2 G/DL (ref 3.5–5)
ALP SERPL-CCNC: 335 U/L (ref 38–126)
ANION GAP SERPL CALC-SCNC: 6 MMOL/L (ref 5–19)
ANISOCYTOSIS BLD QL SMEAR: (no result)
APPEARANCE UR: (no result)
APTT BLD: 39.2 SEC (ref 23.5–35.8)
APTT PPP: (no result) S
ARTERIAL BLOOD FIO2: (no result)
ARTERIAL BLOOD H2CO3: 1.68 MMOL/L (ref 1.05–1.35)
ARTERIAL BLOOD HCO3: 30.8 MMOL/L (ref 20–24)
ARTERIAL BLOOD PCO2: 55.7 MMHG (ref 35–45)
ARTERIAL BLOOD PH: 7.36 (ref 7.35–7.45)
ARTERIAL BLOOD PO2: 62 MMHG (ref 80–100)
ARTERIAL BLOOD TOTAL CO2: 32.5 MMOL/L (ref 21–25)
AST SERPL-CCNC: 320 U/L (ref 14–36)
BASE EXCESS BLDA CALC-SCNC: 4.1 MMOL/L
BASOPHILS NFR BLD MANUAL: 0 % (ref 0–2)
BILIRUB DIRECT SERPL-MCNC: 6.4 MG/DL (ref 0–0.4)
BILIRUB SERPL-MCNC: 7.4 MG/DL (ref 0.2–1.3)
BILIRUB UR QL STRIP: (no result)
BUN SERPL-MCNC: 12 MG/DL (ref 7–20)
CALCIUM: 5.9 MG/DL (ref 8.4–10.2)
CHLORIDE SERPL-SCNC: 76 MMOL/L (ref 98–107)
CO2 SERPL-SCNC: 37 MMOL/L (ref 22–30)
EOSINOPHIL NFR BLD MANUAL: 1 % (ref 0–6)
ERYTHROCYTE [DISTWIDTH] IN BLOOD BY AUTOMATED COUNT: 18.1 % (ref 11.5–14)
GLUCOSE SERPL-MCNC: 153 MG/DL (ref 75–110)
GLUCOSE UR STRIP-MCNC: NEGATIVE MG/DL
HCT VFR BLD CALC: 29.6 % (ref 36–47)
HGB BLD-MCNC: 10 G/DL (ref 12–15.5)
INR PPP: 1.03
KETONES UR STRIP-MCNC: NEGATIVE MG/DL
MACROCYTES BLD QL SMEAR: (no result)
MCH RBC QN AUTO: 34.2 PG (ref 27–33.4)
MCHC RBC AUTO-ENTMCNC: 33.8 G/DL (ref 32–36)
MCV RBC AUTO: 101 FL (ref 80–97)
MONOCYTES % (MANUAL): 3 % (ref 3–13)
NEUTS BAND NFR BLD MANUAL: 1 % (ref 3–5)
NITRITE UR QL STRIP: POSITIVE
NRBC BLD AUTO-RTO: 2 /100 WBC
PH UR STRIP: 5 [PH] (ref 5–9)
PHOSPHATE SERPL-MCNC: 3.6 MG/DL (ref 2.5–4.5)
PLATELET # BLD: 68 10^3/UL (ref 150–450)
PLATELET COMMENT: ADEQUATE
POLYCHROMASIA BLD QL SMEAR: (no result)
POTASSIUM SERPL-SCNC: 3.8 MMOL/L (ref 3.6–5)
PROT SERPL-MCNC: 4.5 G/DL (ref 6.3–8.2)
PROT UR STRIP-MCNC: 100 MG/DL
PROTHROMBIN TIME: 13.7 SEC (ref 11.4–15.4)
RBC # BLD AUTO: 2.93 10^6/UL (ref 3.72–5.28)
SAO2 % BLDA: 90.5 % (ref 94–98)
SEGMENTED NEUTROPHILS % (MAN): 87 % (ref 42–78)
SP GR UR STRIP: 1.02
TOTAL CELLS COUNTED BLD: 100
UROBILINOGEN UR-MCNC: 4 MG/DL (ref ?–2)
VARIANT LYMPHS NFR BLD MANUAL: 8 % (ref 13–45)
WBC # BLD AUTO: 6.1 10^3/UL (ref 4–10.5)

## 2021-01-28 RX ADMIN — EPINEPHRINE PRN MLS/HR: 1 INJECTION, SOLUTION, CONCENTRATE INTRAVENOUS at 04:15

## 2021-01-28 RX ADMIN — EPINEPHRINE PRN MLS/HR: 1 INJECTION, SOLUTION, CONCENTRATE INTRAVENOUS at 03:31

## 2021-01-28 RX ADMIN — EPINEPHRINE PRN MLS/HR: 1 INJECTION, SOLUTION, CONCENTRATE INTRAVENOUS at 00:00

## 2021-01-28 NOTE — DEATH SUMMARY
Death Summary


Date : 20


Autopsy: No


Resuscitation Status: Other - Patient made full code with the intent of organ 

procurement.





- Final Diagnosis


(1) Anoxic encephalopathy


Is this a current diagnosis for this admission?: Yes   





(2) Cerebral edema due to anoxia


Is this a current diagnosis for this admission?: Yes   


Hospital Course:: 





Silvana Smith is a 45-year-old female with a history of hypertension, depression

and moderate EtOH abuse.  She was found by her  after unknown time to 

have agonal breathing.  EMS was activated and CPR was performed in the home.  

After arriving to the emergency department, she again arrested with a brief 

round of CPR prior to ROSC.  Head CT in the emergency department showed early 

evidence of cerebral edema.  Patient was brought to the ICU, was cooled as per 

postarrest protocol and then rewarmed over the course of an additional 48 hours.

 Patient became hemodynamically unstable and her pupils became fixed and 

dilated.  Patient had no corneal reflexes, no gag reflexes and repeat CT scan 

showed severe and expanding edema.  Patient's family was notified of her acute 

change and end-of-life discussions were held.  Patient was made DNR.  We then 

spoke further with the family who agreed to pursue organ donation.  As per 

guidelines set forth by [facility], patient's body was kept alive 

and patient was transferred to the operating room where she was extubated with 

her  present in the room.  Following extubation, she was without breath 

sounds and without pulse or heart sounds via auscultation.  She was pronounced 

dead at 6:35 am.  Patient's  was notified of her death.

## 2021-01-28 NOTE — RADIOLOGY REPORT (SQ)
CLINICAL INDICATION: Respiratory failure.



TECHNIQUE: A single portable AP  view was obtained of the chest

at 0032 hours.



COMPARISON: January 24, 2021.



FINDINGS: The cardiomediastinal  silhouette is enlarged. The

lungs demonstrate progressive left basilar airspace disease and

adjacent pleural effusion. Crowding right lung base, likely

volume related. 



Right IJ central venous catheter tip projects over SVC RA

junction, satisfactory. Nasogastric tube tip in good position.



The endotracheal tube tip is above the thoracic inlet and

approximately 9 cm above the terra..



IMPRESSION: Progressive left basilar airspace disease and

adjacent pleural reaction. This is suspicious for pneumonia.



Endotracheal tube needs to be advanced approximately 5 cm..